# Patient Record
Sex: FEMALE | Race: BLACK OR AFRICAN AMERICAN | NOT HISPANIC OR LATINO | URBAN - METROPOLITAN AREA
[De-identification: names, ages, dates, MRNs, and addresses within clinical notes are randomized per-mention and may not be internally consistent; named-entity substitution may affect disease eponyms.]

---

## 2018-02-22 ENCOUNTER — OUTPATIENT (OUTPATIENT)
Dept: OUTPATIENT SERVICES | Facility: HOSPITAL | Age: 38
LOS: 1 days | Discharge: HOME | End: 2018-02-22

## 2018-02-22 ENCOUNTER — APPOINTMENT (OUTPATIENT)
Dept: ANTEPARTUM | Facility: CLINIC | Age: 38
End: 2018-02-22

## 2018-03-29 ENCOUNTER — OUTPATIENT (OUTPATIENT)
Dept: OUTPATIENT SERVICES | Facility: HOSPITAL | Age: 38
LOS: 1 days | Discharge: HOME | End: 2018-03-29

## 2018-03-29 ENCOUNTER — APPOINTMENT (OUTPATIENT)
Dept: ANTEPARTUM | Facility: CLINIC | Age: 38
End: 2018-03-29

## 2018-03-29 ENCOUNTER — NON-APPOINTMENT (OUTPATIENT)
Age: 38
End: 2018-03-29

## 2018-03-29 VITALS
TEMPERATURE: 97.7 F | BODY MASS INDEX: 35.38 KG/M2 | HEIGHT: 67 IN | DIASTOLIC BLOOD PRESSURE: 70 MMHG | SYSTOLIC BLOOD PRESSURE: 127 MMHG | OXYGEN SATURATION: 99 % | WEIGHT: 225.4 LBS | HEART RATE: 110 BPM

## 2018-03-29 DIAGNOSIS — O24.919 UNSPECIFIED DIABETES MELLITUS IN PREGNANCY, UNSPECIFIED TRIMESTER: ICD-10-CM

## 2018-03-29 DIAGNOSIS — O99.330 SMOKING (TOBACCO) COMPLICATING PREGNANCY, UNSPECIFIED TRIMESTER: ICD-10-CM

## 2018-03-29 LAB
FETAL MOVEMENT: PRESENT
OB COMMENTS: NORMAL
SCHEDULED VISIT: YES
URINE ALBUMIN/PROTEIN: NORMAL

## 2018-03-29 RX ORDER — LANCETS 33 GAUGE
EACH MISCELLANEOUS
Qty: 120 | Refills: 4 | Status: ACTIVE | COMMUNITY
Start: 2018-03-29 | End: 1900-01-01

## 2018-03-29 RX ORDER — CHLORHEXIDINE GLUCONATE 4 %
325 (65 FE) LIQUID (ML) TOPICAL
Qty: 100 | Refills: 0 | Status: ACTIVE | COMMUNITY
Start: 2018-02-09

## 2018-03-29 RX ORDER — BLOOD-GLUCOSE METER
W/DEVICE EACH MISCELLANEOUS
Qty: 1 | Refills: 0 | Status: ACTIVE | COMMUNITY
Start: 2018-03-29 | End: 1900-01-01

## 2018-03-29 RX ORDER — BLOOD SUGAR DIAGNOSTIC
STRIP MISCELLANEOUS
Qty: 150 | Refills: 4 | Status: ACTIVE | COMMUNITY
Start: 2018-03-29 | End: 1900-01-01

## 2018-03-29 RX ORDER — PRENATAL VIT NO.126/IRON/FOLIC 28MG-0.8MG
28-0.8 TABLET ORAL
Qty: 30 | Refills: 0 | Status: ACTIVE | COMMUNITY
Start: 2018-02-09

## 2018-03-30 LAB — GLUCOSE BLDC GLUCOMTR-MCNC: 130

## 2018-04-05 ENCOUNTER — APPOINTMENT (OUTPATIENT)
Dept: ANTEPARTUM | Facility: CLINIC | Age: 38
End: 2018-04-05

## 2018-04-05 ENCOUNTER — APPOINTMENT (OUTPATIENT)
Dept: OBGYN | Facility: CLINIC | Age: 38
End: 2018-04-05

## 2018-04-05 ENCOUNTER — NON-APPOINTMENT (OUTPATIENT)
Age: 38
End: 2018-04-05

## 2018-04-05 ENCOUNTER — OTHER (OUTPATIENT)
Age: 38
End: 2018-04-05

## 2018-04-05 ENCOUNTER — OUTPATIENT (OUTPATIENT)
Dept: OUTPATIENT SERVICES | Facility: HOSPITAL | Age: 38
LOS: 1 days | Discharge: HOME | End: 2018-04-05

## 2018-04-05 ENCOUNTER — RESULT REVIEW (OUTPATIENT)
Age: 38
End: 2018-04-05

## 2018-04-05 VITALS
OXYGEN SATURATION: 100 % | SYSTOLIC BLOOD PRESSURE: 129 MMHG | TEMPERATURE: 96.3 F | WEIGHT: 224 LBS | DIASTOLIC BLOOD PRESSURE: 65 MMHG | BODY MASS INDEX: 35.16 KG/M2 | HEART RATE: 94 BPM | HEIGHT: 67 IN

## 2018-04-05 LAB
FETAL HEART DESCRIPTION: NORMAL
FETAL HEART RATE (BPM): 126
FETAL MOVEMENT: PRESENT
FETAL PRESENTATION: NORMAL
GLUCOSE BLDC GLUCOMTR-MCNC: 109
OB COMMENTS: NORMAL
SCHEDULED VISIT: YES
WEEKS GESTATION: NORMAL

## 2018-04-06 DIAGNOSIS — Z3A.30 30 WEEKS GESTATION OF PREGNANCY: ICD-10-CM

## 2018-04-06 DIAGNOSIS — O24.419 GESTATIONAL DIABETES MELLITUS IN PREGNANCY, UNSPECIFIED CONTROL: ICD-10-CM

## 2018-04-12 ENCOUNTER — NON-APPOINTMENT (OUTPATIENT)
Age: 38
End: 2018-04-12

## 2018-04-12 ENCOUNTER — APPOINTMENT (OUTPATIENT)
Dept: OBGYN | Facility: CLINIC | Age: 38
End: 2018-04-12

## 2018-04-12 ENCOUNTER — APPOINTMENT (OUTPATIENT)
Dept: ANTEPARTUM | Facility: CLINIC | Age: 38
End: 2018-04-12

## 2018-04-12 ENCOUNTER — OUTPATIENT (OUTPATIENT)
Dept: OUTPATIENT SERVICES | Facility: HOSPITAL | Age: 38
LOS: 1 days | Discharge: HOME | End: 2018-04-12

## 2018-04-12 ENCOUNTER — OTHER (OUTPATIENT)
Age: 38
End: 2018-04-12

## 2018-04-12 VITALS
SYSTOLIC BLOOD PRESSURE: 134 MMHG | DIASTOLIC BLOOD PRESSURE: 77 MMHG | TEMPERATURE: 97.1 F | WEIGHT: 226.19 LBS | OXYGEN SATURATION: 100 % | BODY MASS INDEX: 35.5 KG/M2 | HEART RATE: 100 BPM | HEIGHT: 67 IN

## 2018-04-12 LAB
FETAL HEART DESCRIPTION: NORMAL
FETAL HEART RATE (BPM): 137
FETAL MOVEMENT: PRESENT
FETAL PRESENTATION: NORMAL
GLUCOSE BLDC GLUCOMTR-MCNC: 180
OB COMMENTS: NORMAL
SCHEDULED VISIT: YES
URINE ALBUMIN/PROTEIN: 2
URINE GLUCOSE: 1000
URINE KETONES: NEGATIVE
WEEKS GESTATION: NORMAL

## 2018-04-12 RX ORDER — GLYBURIDE 2.5 MG/1
2.5 TABLET ORAL DAILY
Qty: 30 | Refills: 5 | Status: ACTIVE | COMMUNITY
Start: 2018-04-12 | End: 1900-01-01

## 2018-04-19 ENCOUNTER — APPOINTMENT (OUTPATIENT)
Dept: ANTEPARTUM | Facility: CLINIC | Age: 38
End: 2018-04-19

## 2018-04-19 ENCOUNTER — NON-APPOINTMENT (OUTPATIENT)
Age: 38
End: 2018-04-19

## 2018-04-19 ENCOUNTER — OUTPATIENT (OUTPATIENT)
Dept: OUTPATIENT SERVICES | Facility: HOSPITAL | Age: 38
LOS: 1 days | Discharge: HOME | End: 2018-04-19

## 2018-04-19 VITALS — SYSTOLIC BLOOD PRESSURE: 121 MMHG | DIASTOLIC BLOOD PRESSURE: 60 MMHG | HEART RATE: 86 BPM

## 2018-04-19 VITALS
HEART RATE: 96 BPM | WEIGHT: 223 LBS | BODY MASS INDEX: 34.93 KG/M2 | OXYGEN SATURATION: 97 % | SYSTOLIC BLOOD PRESSURE: 159 MMHG | DIASTOLIC BLOOD PRESSURE: 81 MMHG | TEMPERATURE: 98 F

## 2018-04-19 LAB
GLUCOSE BLDC GLUCOMTR-MCNC: 148
OB COMMENTS: NORMAL
SCHEDULED VISIT: YES
URINE ALBUMIN/PROTEIN: NORMAL
WEEKS GESTATION: NORMAL

## 2018-04-26 ENCOUNTER — APPOINTMENT (OUTPATIENT)
Dept: ANTEPARTUM | Facility: CLINIC | Age: 38
End: 2018-04-26

## 2018-04-26 ENCOUNTER — OUTPATIENT (OUTPATIENT)
Dept: OUTPATIENT SERVICES | Facility: HOSPITAL | Age: 38
LOS: 1 days | Discharge: HOME | End: 2018-04-26

## 2018-04-26 ENCOUNTER — NON-APPOINTMENT (OUTPATIENT)
Age: 38
End: 2018-04-26

## 2018-04-26 VITALS — SYSTOLIC BLOOD PRESSURE: 133 MMHG | DIASTOLIC BLOOD PRESSURE: 73 MMHG

## 2018-04-26 VITALS
BODY MASS INDEX: 34.64 KG/M2 | WEIGHT: 221.19 LBS | HEART RATE: 92 BPM | SYSTOLIC BLOOD PRESSURE: 141 MMHG | OXYGEN SATURATION: 100 % | DIASTOLIC BLOOD PRESSURE: 73 MMHG | TEMPERATURE: 98.6 F

## 2018-04-26 LAB
BILIRUB UR QL STRIP: NEGATIVE
CLARITY UR: CLEAR
COLLECTION METHOD: NORMAL
FETAL HEART DESCRIPTION: NORMAL
FETAL HEART RATE (BPM): 144
FETAL MOVEMENT: PRESENT
FETAL PRESENTATION: NORMAL
GLUCOSE BLDC GLUCOMTR-MCNC: 82
GLUCOSE UR-MCNC: NEGATIVE
HCG UR QL: 0.2 EU/DL
HGB UR QL STRIP.AUTO: NEGATIVE
KETONES UR-MCNC: NORMAL
LEUKOCYTE ESTERASE UR QL STRIP: NEGATIVE
NITRITE UR QL STRIP: NEGATIVE
OB COMMENTS: NORMAL
PH UR STRIP: 6
PROT UR STRIP-MCNC: NEGATIVE
SCHEDULED VISIT: NORMAL
SP GR UR STRIP: 1.02
URINE ALBUMIN/PROTEIN: NEGATIVE
URINE GLUCOSE: NEGATIVE
URINE KETONES: NORMAL
WEEKS GESTATION: NORMAL

## 2018-04-30 DIAGNOSIS — O24.419 GESTATIONAL DIABETES MELLITUS IN PREGNANCY, UNSPECIFIED CONTROL: ICD-10-CM

## 2018-05-03 ENCOUNTER — APPOINTMENT (OUTPATIENT)
Dept: ANTEPARTUM | Facility: CLINIC | Age: 38
End: 2018-05-03

## 2018-05-10 ENCOUNTER — OUTPATIENT (OUTPATIENT)
Dept: OUTPATIENT SERVICES | Facility: HOSPITAL | Age: 38
LOS: 1 days | Discharge: HOME | End: 2018-05-10

## 2018-05-10 ENCOUNTER — APPOINTMENT (OUTPATIENT)
Dept: ANTEPARTUM | Facility: CLINIC | Age: 38
End: 2018-05-10

## 2018-05-10 VITALS
BODY MASS INDEX: 35.02 KG/M2 | HEART RATE: 78 BPM | SYSTOLIC BLOOD PRESSURE: 129 MMHG | WEIGHT: 223.56 LBS | OXYGEN SATURATION: 99 % | TEMPERATURE: 97.3 F | DIASTOLIC BLOOD PRESSURE: 76 MMHG

## 2018-05-10 LAB
BILIRUB UR QL STRIP: NEGATIVE
CLARITY UR: CLEAR
COLLECTION METHOD: NORMAL
GLUCOSE BLDC GLUCOMTR-MCNC: 90
GLUCOSE UR-MCNC: NEGATIVE
HCG UR QL: 0.2 EU/DL
HGB UR QL STRIP.AUTO: NEGATIVE
KETONES UR-MCNC: NEGATIVE
LEUKOCYTE ESTERASE UR QL STRIP: NEGATIVE
NITRITE UR QL STRIP: NEGATIVE
OB COMMENTS: NORMAL
PH UR STRIP: 6.5
PROT UR STRIP-MCNC: NEGATIVE
SCHEDULED VISIT: YES
SP GR UR STRIP: 1.01
URINE ALBUMIN/PROTEIN: NORMAL
WEEKS GESTATION: NORMAL

## 2018-05-11 DIAGNOSIS — O24.919 UNSPECIFIED DIABETES MELLITUS IN PREGNANCY, UNSPECIFIED TRIMESTER: ICD-10-CM

## 2018-05-14 LAB
GP B STREP DNA SPEC QL NAA+PROBE: NORMAL
GP B STREP DNA SPEC QL NAA+PROBE: NOT DETECTED
SOURCE GBS: NORMAL

## 2018-05-17 ENCOUNTER — APPOINTMENT (OUTPATIENT)
Dept: ANTEPARTUM | Facility: CLINIC | Age: 38
End: 2018-05-17

## 2018-05-17 ENCOUNTER — OUTPATIENT (OUTPATIENT)
Dept: OUTPATIENT SERVICES | Facility: HOSPITAL | Age: 38
LOS: 1 days | Discharge: HOME | End: 2018-05-17

## 2018-05-17 ENCOUNTER — TRANSCRIPTION ENCOUNTER (OUTPATIENT)
Age: 38
End: 2018-05-17

## 2018-05-17 ENCOUNTER — MEDICATION RENEWAL (OUTPATIENT)
Age: 38
End: 2018-05-17

## 2018-05-17 VITALS — HEIGHT: 67 IN | HEART RATE: 95 BPM | TEMPERATURE: 97.4 F | OXYGEN SATURATION: 99 %

## 2018-05-17 VITALS
DIASTOLIC BLOOD PRESSURE: 76 MMHG | RESPIRATION RATE: 18 BRPM | HEART RATE: 81 BPM | TEMPERATURE: 98 F | SYSTOLIC BLOOD PRESSURE: 136 MMHG

## 2018-05-17 LAB
A VAGINAE DNA VAG QL NAA+PROBE: NORMAL
BVAB2 DNA VAG QL NAA+PROBE: NORMAL
C KRUSEI DNA VAG QL NAA+PROBE: NEGATIVE
C TRACH RRNA SPEC QL NAA+PROBE: NEGATIVE
FETAL HEART DESCRIPTION: NORMAL
FETAL HEART RATE (BPM): 141
FETAL MOVEMENT: PRESENT
FETAL PRESENTATION: NORMAL
GLUCOSE BLDC GLUCOMTR-MCNC: 95
MEGA1 DNA VAG QL NAA+PROBE: NORMAL
N GONORRHOEA RRNA SPEC QL NAA+PROBE: NEGATIVE
OB COMMENTS: NORMAL
SCHEDULED VISIT: YES
T VAGINALIS RRNA SPEC QL NAA+PROBE: NEGATIVE
WEEKS GESTATION: NORMAL

## 2018-05-17 RX ORDER — GLYBURIDE 2.5 MG/1
2.5 TABLET ORAL DAILY
Qty: 7 | Refills: 0 | Status: ACTIVE | COMMUNITY
Start: 2018-05-17 | End: 1900-01-01

## 2018-05-17 NOTE — OB PROVIDER TRIAGE NOTE - NSHPPHYSICALEXAM_GEN_ALL_CORE
PHYSICAL EXAM:  T(F): 97.9 (05-17 @ 13:06)  HR: 81 (05-17 @ 13:06)  BP: 136/76 (05-17 @ 13:06)  RR: 18 (05-17 @ 13:06)  Constitutional: AAOx3, NAD  Abdomen: Soft, gravid, nontender, lightly palpable ctx

## 2018-05-17 NOTE — OB PROVIDER TRIAGE NOTE - HISTORY OF PRESENT ILLNESS
37  at 36w3d by LMP, here sent from [facility] for contractions, comes and goes, q20 mins, since this morning. 4-5/10 pain. Denies LOF or VB. Good fetal movement. GDMA2 during the pregnancy, fasting 80-95, 2hpp , on glyburide 7.5 daily. No other issues with the pregnancy.

## 2018-05-17 NOTE — OB PROVIDER TRIAGE NOTE - NSOBPROVIDERNOTE_OBGYN_ALL_OB_FT
37  at 36w3d, GBS neg, with ctx pain, desires to go home and drink water and relax, will return to L&D if the ctx get worse. precautions given, pt to f/u with next prenatal appt next week. glyburide refill given to the pt. 37  at 36w3d, GBS neg, with ctx pain, desires to go home and drink water and relax, will return to L&D if the ctx get worse. precautions given, pt to f/u with next prenatal appt next week. glyburide refill given to the pt sent via [employer] 37  at 36w3d, GBS neg, with ctx pain, desires to go home and drink water and relax, will return to L&D if the ctx get worse. precautions given, pt to f/u with next prenatal appt next week. glyburide refill given to the pt sent via [employer]    Attending    Pt seen and examined and agree with resident note and plan of care  nst reactive

## 2018-05-17 NOTE — DISCHARGE NOTE ANTEPARTUM - CARE PLAN
Principal Discharge DX:	Pregnant  Goal:	healthy patient  Assessment and plan of treatment:	healthy patient, followup with your doctor

## 2018-05-17 NOTE — DISCHARGE NOTE ANTEPARTUM - CARE PROVIDER_API CALL
Renan Trevino), Medical Genetics; Obstetrics and Gynecology  [address] [place] [address]  Phone: [phone]  Fax: [phone]

## 2018-05-17 NOTE — DISCHARGE NOTE ANTEPARTUM - CARE PROVIDERS DIRECT ADDRESSES
[email or web address]

## 2018-05-17 NOTE — DISCHARGE NOTE ANTEPARTUM - PATIENT PORTAL LINK FT
You can access the [facility] Patient Portal, offered by [facility], by registering with the following website: [email or web address]

## 2018-05-19 ENCOUNTER — OUTPATIENT (OUTPATIENT)
Dept: OUTPATIENT SERVICES | Facility: HOSPITAL | Age: 38
LOS: 1 days | Discharge: HOME | End: 2018-05-19

## 2018-05-19 ENCOUNTER — TRANSCRIPTION ENCOUNTER (OUTPATIENT)
Age: 38
End: 2018-05-19

## 2018-05-19 VITALS
TEMPERATURE: 98 F | DIASTOLIC BLOOD PRESSURE: 68 MMHG | RESPIRATION RATE: 20 BRPM | HEART RATE: 85 BPM | SYSTOLIC BLOOD PRESSURE: 139 MMHG

## 2018-05-19 VITALS — TEMPERATURE: 98 F

## 2018-05-19 DIAGNOSIS — Z98.890 OTHER SPECIFIED POSTPROCEDURAL STATES: Chronic | ICD-10-CM

## 2018-05-19 LAB
AMPHET UR-MCNC: NEGATIVE — SIGNIFICANT CHANGE UP
APPEARANCE UR: CLEAR — SIGNIFICANT CHANGE UP
BARBITURATES UR SCN-MCNC: NEGATIVE — SIGNIFICANT CHANGE UP
BENZODIAZ UR-MCNC: NEGATIVE — SIGNIFICANT CHANGE UP
BILIRUB UR-MCNC: NEGATIVE — SIGNIFICANT CHANGE UP
COCAINE METAB.OTHER UR-MCNC: NEGATIVE — SIGNIFICANT CHANGE UP
COLOR SPEC: YELLOW — SIGNIFICANT CHANGE UP
DIFF PNL FLD: NEGATIVE — SIGNIFICANT CHANGE UP
ESTIMATED AVERAGE GLUCOSE: 131 MG/DL — HIGH (ref 68–114)
GLUCOSE UR QL: NEGATIVE MG/DL — SIGNIFICANT CHANGE UP
HBA1C BLD-MCNC: 6.2 % — HIGH (ref 4–5.6)
HCT VFR BLD CALC: 36.8 % — LOW (ref 37–47)
HGB BLD-MCNC: 11.8 G/DL — LOW (ref 12–16)
HIV 1+2 AB+HIV1 P24 AG SERPL QL IA: SIGNIFICANT CHANGE UP
KETONES UR-MCNC: NEGATIVE — SIGNIFICANT CHANGE UP
LEUKOCYTE ESTERASE UR-ACNC: NEGATIVE — SIGNIFICANT CHANGE UP
MCHC RBC-ENTMCNC: 23.6 PG — LOW (ref 27–31)
MCHC RBC-ENTMCNC: 32.1 G/DL — SIGNIFICANT CHANGE UP (ref 32–37)
MCV RBC AUTO: 73.5 FL — LOW (ref 81–99)
METHADONE UR-MCNC: NEGATIVE — SIGNIFICANT CHANGE UP
NITRITE UR-MCNC: NEGATIVE — SIGNIFICANT CHANGE UP
NRBC # BLD: 0 /100 WBCS — SIGNIFICANT CHANGE UP (ref 0–0)
OPIATES UR-MCNC: NEGATIVE — SIGNIFICANT CHANGE UP
PCP SPEC-MCNC: SIGNIFICANT CHANGE UP
PH UR: 6.5 — SIGNIFICANT CHANGE UP (ref 5–8)
PLATELET # BLD AUTO: 271 K/UL — SIGNIFICANT CHANGE UP (ref 130–400)
PROPOXYPHENE QUALITATIVE URINE RESULT: NEGATIVE — SIGNIFICANT CHANGE UP
PROT UR-MCNC: NEGATIVE MG/DL — SIGNIFICANT CHANGE UP
RBC # BLD: 5.01 M/UL — SIGNIFICANT CHANGE UP (ref 4.2–5.4)
RBC # FLD: 14.7 % — HIGH (ref 11.5–14.5)
SP GR SPEC: <=1.005 — SIGNIFICANT CHANGE UP (ref 1.01–1.03)
UROBILINOGEN FLD QL: 0.2 MG/DL — SIGNIFICANT CHANGE UP (ref 0.2–0.2)
WBC # BLD: 9.71 K/UL — SIGNIFICANT CHANGE UP (ref 4.8–10.8)
WBC # FLD AUTO: 9.71 K/UL — SIGNIFICANT CHANGE UP (ref 4.8–10.8)

## 2018-05-19 RX ORDER — SODIUM CHLORIDE 9 MG/ML
1000 INJECTION, SOLUTION INTRAVENOUS
Qty: 0 | Refills: 0 | Status: DISCONTINUED | OUTPATIENT
Start: 2018-05-19 | End: 2018-06-03

## 2018-05-19 RX ORDER — ALBUTEROL 90 UG/1
2.5 AEROSOL, METERED ORAL ONCE
Qty: 0 | Refills: 0 | Status: COMPLETED | OUTPATIENT
Start: 2018-05-19 | End: 2018-05-19

## 2018-05-19 RX ADMIN — ALBUTEROL 2.5 MILLIGRAM(S): 90 AEROSOL, METERED ORAL at 06:24

## 2018-05-19 NOTE — OB PROVIDER TRIAGE NOTE - NSHPLABSRESULTS_GEN_ALL_CORE
Sonogram 5/17 @36w3d  BPP 8/8 mvp 43mm  5/10 @35w3d EFW 2627gm (44%) mvp 45mm   GBS negative  GC/CT negative

## 2018-05-19 NOTE — OB PROVIDER TRIAGE NOTE - HISTORY OF PRESENT ILLNESS
37y  at 26w5d GA dated by 24weeks fabián presents to L&D due to contractions every 15min 8/10. Denies VB or LOF. Good FM. She was seen in L&D 2 days ago with contractions and was closed on vaginal exam. Reports contractions got worse since then. Denies fever/chills, nausea/vomiting, diarrhea, urinary symptoms. Patient has GDMA2 on glyburide 2.5mg QD, FS well controlled (FS 80-90; PP 100s). Smoker (2igs/day). No other complications during this pregnancy. GBS negative.     OBHx:   FT x2 largest [id number] ( and ) no complications, sabx1 with d&c and etopx2 with d&cx2  GYNHx: h/o chlamydia. Denies h/o ovarian cysts, abnormal paps, fibroids, other STIs 37y  at 36w5d GA dated by 24weeks fabián presents to L&D due to contractions every 15min 8/10. Denies VB or LOF. Good FM. She was seen in L&D 2 days ago with contractions and was closed on vaginal exam. Reports contractions got worse since then. Denies fever/chills, nausea/vomiting, diarrhea, urinary symptoms. Patient has GDMA2 on glyburide 2.5mg QD, FS well controlled (FS 80-90; PP 100s). Smoker (2igs/day). No other complications during this pregnancy. GBS negative.     OBHx:   FT x2 largest [id number] ( and ) no complications, sabx1 with d&c and etopx2 with d&cx2  GYNHx: h/o chlamydia. Denies h/o ovarian cysts, abnormal paps, fibroids, other STIs

## 2018-05-19 NOTE — PROGRESS NOTE ADULT - SUBJECTIVE AND OBJECTIVE BOX
PGY 2 note:    Patient seen at bedside s/p IV hydration and nebulizer treatment. Feeling better at this time, no more contractions and wheezing and SOB resolved. Pre peak flow 150, and post peak flow 200, saturating at 100% on RA.     T(F): 98.24 (19 May 2018 07:05), Max: 98.7 (19 May 2018 01:22)  FS: 96 (random) / 121  HR: 85 (19 May 2018 07:05) (65 - 87)  BP: 139/68 (19 May 2018 07:05) (114/72 - 139/68)  RR: 20 (19 May 2018 07:05) (20 - 20)  SpO2: 100% (19 May 2018 07:02) (99% - 100%)    Gen: NAD, A&Ox3  Heart: S1S2, RRR  Lungs: CTAB  Abd: Gravid, soft, NT, nonpalpable ctx  SVE: C/L/P  EFM: 145/mod/+accels  [place]: None    No labs done    Meds:  0624 Albuterol nebulizer

## 2018-05-19 NOTE — DISCHARGE NOTE ANTEPARTUM - PLAN OF CARE
Healthy pregnancy Discharge home  Labor Precautions given  Oral hydration encouraged  Tylenol PRN for pain  FKC instructions  F/u with OB at next scheduled appt

## 2018-05-19 NOTE — DISCHARGE NOTE ANTEPARTUM - CARE PROVIDER_API CALL
Renan Trevino), Medical Genetics; Obstetrics and Gynecology  [address] [place] [address]  Phone: [phone]  Fax: [phone]

## 2018-05-19 NOTE — OB PROVIDER TRIAGE NOTE - NSHPPHYSICALEXAM_GEN_ALL_CORE
Vital Signs   T(C): 36.7   T(F): 98   HR: 87   BP: 128/71  Udip: negative   GEN: NAD alert and oriented  ABD: soft gravid, not tender, no palpable contractions  EFM:  [place]:  VE: Vital Signs   T(C): 36.7   T(F): 98   HR: 87   BP: 128/71  FS:  Udip: negative   GEN: NAD alert and oriented  ABD: soft gravid, not tender, no palpable contractions  EFM: 130/mod/+accels  [place]: irregular  VE: C/L/P

## 2018-05-19 NOTE — PROGRESS NOTE ADULT - ASSESSMENT
A/P: 37y  at 36w5d GA, GDMA2 on glyburide 2.5mg QD well controlled, asthma, s/p IV hydration, s/p Albuterol neb tx, not in  labor, maternal and fetal well being reassured  - DC home  - DC instructions given  - PTL precautions given  - FKC instructions given  - F/u with HR OB this week at next scheduled appt    Dr. Betancourt and Dr. Bowman aware

## 2018-05-19 NOTE — OB PROVIDER TRIAGE NOTE - NSOBPROVIDERNOTE_OBGYN_ALL_OB_FT
37y  at 26w5d GA, GDMA2 on glyburide 2.5mg QD well controlled, not in labor  - 37y  at 36w5d GA, GDMA2 on glyburide 2.5mg QD well controlled, for therapeutic rest  - IVFs  - Stadol PRN  - Cotinuous monitoring  - CBC, Hba1c and HIV 3rd trimester (patient did not go for this labs yet)  - Reevaluate     Dr Poon and Dr Bowman aware.

## 2018-05-19 NOTE — DISCHARGE NOTE ANTEPARTUM - PATIENT PORTAL LINK FT
You can access the [facility] Patient Portal, offered by [facility], by registering with the following website: [email or web address] Psychiatric [facility]

## 2018-05-19 NOTE — DISCHARGE NOTE ANTEPARTUM - HOSPITAL COURSE
Patient observed in L&D to rule out PTL and for IV hydration. Also complained of wheezing and shortness of breath. PTL ruled out and patient felt much better after treatment. DC home.

## 2018-05-19 NOTE — PROGRESS NOTE ADULT - SUBJECTIVE AND OBJECTIVE BOX
PGY1 note    Patient seen aat bedside for complaints of chest tightness and shortness of breath. Reports she gets similar symptoms with her asthma attacks. Takes albuterol inhaler or nebulizer at home PRN.    T(F): 98.7 (01:22)  HR: 67 (06:52)  BP: 128/62 (06:39)  RR: 20 (01:22)    EFM:   TOCO:  SVE:    Medications:  ALBUTerol    0.083%: 2.5 milliGRAM(s) (05-19-18 @ 06:24)      Labs:                        11.8   9.71  )-----------( 271      ( 19 May 2018 03:00 )             36.8             Urinalysis Basic - ( 19 May 2018 03:51 )    Color: Yellow / Appearance: Clear / SG: <=1.005 / pH: x  Gluc: x / Ketone: Negative  / Bili: Negative / Urobili: 0.2 mg/dL   Blood: x / Protein: Negative mg/dL / Nitrite: Negative   Leuk Esterase: Negative / RBC: x / WBC x   Sq Epi: x / Non Sq Epi: x / Bacteria: x          A/P:   37y at Gestational Age     - c/w current management  - continuous toco/EFM  - pain management PRN    D/w  PGY1 note    Patient seen aat bedside for complaints of chest tightness and shortness of breath. Reports she gets similar symptoms with her asthma attacks. Takes albuterol inhaler or nebulizer at home PRN.    T(F): 98.7 (01:22)  HR: 67 (06:52)  BP: 128/62 (06:39)  RR: 20 (01:22)  sat: %    GEN: appears in respiratory distress  lungs: CTAB, wheezes bilaterally  CVS: RRR, normal S1/S2  EFM: 140/mod/accels+   TOCO: occasional ctx  SVE: deferred, last exam at 0206 was 0/0/-3      Labs:                        11.8   9.71  )-----------( 271      ( 19 May 2018 03:00 )             36.8     Urinalysis Basic - ( 19 May 2018 03:51 )    Color: Yellow / Appearance: Clear / SG: <=1.005 / pH: x  Gluc: x / Ketone: Negative  / Bili: Negative / Urobili: 0.2 mg/dL   Blood: x / Protein: Negative mg/dL / Nitrite: Negative   Leuk Esterase: Negative / RBC: x / WBC x   Sq Epi: x / Non Sq Epi: x / Bacteria: x    A/P:  38 y/o  at 36w5d GA, GDMA2 on glyburide 2.5mg QD well controlled, h/o asthma, on IVF for painful contractions, with clinical asthma attack, s/p albuterol inhaler. PEF pre albuterol 150.  - monitor symptoms  - continuous EFM and toco  - stadol as needed  - FS fasting and 2h PP    Dr. Poon aware. Dr. Bowman to be made aware.

## 2018-05-19 NOTE — DISCHARGE NOTE ANTEPARTUM - CARE PLAN
Principal Discharge DX:	Pregnant and not yet delivered  Goal:	Healthy pregnancy  Assessment and plan of treatment:	Discharge home  Labor Precautions given  Oral hydration encouraged  Tylenol PRN for pain  FKC instructions  F/u with OB at next scheduled appt

## 2018-05-24 ENCOUNTER — APPOINTMENT (OUTPATIENT)
Dept: ANTEPARTUM | Facility: CLINIC | Age: 38
End: 2018-05-24

## 2018-05-28 ENCOUNTER — INPATIENT (INPATIENT)
Facility: HOSPITAL | Age: 38
LOS: 2 days | Discharge: HOME HEALTH | End: 2018-05-31
Attending: OBSTETRICS & GYNECOLOGY | Admitting: OBSTETRICS & GYNECOLOGY

## 2018-05-28 VITALS — TEMPERATURE: 99 F

## 2018-05-28 DIAGNOSIS — Z98.890 OTHER SPECIFIED POSTPROCEDURAL STATES: Chronic | ICD-10-CM

## 2018-05-28 LAB
BASOPHILS # BLD AUTO: 0.04 K/UL — SIGNIFICANT CHANGE UP (ref 0–0.2)
BASOPHILS NFR BLD AUTO: 0.4 % — SIGNIFICANT CHANGE UP (ref 0–1)
EOSINOPHIL # BLD AUTO: 0.17 K/UL — SIGNIFICANT CHANGE UP (ref 0–0.7)
EOSINOPHIL NFR BLD AUTO: 1.7 % — SIGNIFICANT CHANGE UP (ref 0–8)
HCT VFR BLD CALC: 37 % — SIGNIFICANT CHANGE UP (ref 37–47)
HGB BLD-MCNC: 11.8 G/DL — LOW (ref 12–16)
IMM GRANULOCYTES NFR BLD AUTO: 0.7 % — HIGH (ref 0.1–0.3)
LYMPHOCYTES # BLD AUTO: 2.51 K/UL — SIGNIFICANT CHANGE UP (ref 1.2–3.4)
LYMPHOCYTES # BLD AUTO: 25.1 % — SIGNIFICANT CHANGE UP (ref 20.5–51.1)
MCHC RBC-ENTMCNC: 23.4 PG — LOW (ref 27–31)
MCHC RBC-ENTMCNC: 31.9 G/DL — LOW (ref 32–37)
MCV RBC AUTO: 73.3 FL — LOW (ref 81–99)
MONOCYTES # BLD AUTO: 1.16 K/UL — HIGH (ref 0.1–0.6)
MONOCYTES NFR BLD AUTO: 11.6 % — HIGH (ref 1.7–9.3)
NEUTROPHILS # BLD AUTO: 6.06 K/UL — SIGNIFICANT CHANGE UP (ref 1.4–6.5)
NEUTROPHILS NFR BLD AUTO: 60.5 % — SIGNIFICANT CHANGE UP (ref 42.2–75.2)
NRBC # BLD: 0 /100 WBCS — SIGNIFICANT CHANGE UP (ref 0–0)
PLATELET # BLD AUTO: 247 K/UL — SIGNIFICANT CHANGE UP (ref 130–400)
RBC # BLD: 5.05 M/UL — SIGNIFICANT CHANGE UP (ref 4.2–5.4)
RBC # FLD: 14.7 % — HIGH (ref 11.5–14.5)
WBC # BLD: 10.01 K/UL — SIGNIFICANT CHANGE UP (ref 4.8–10.8)
WBC # FLD AUTO: 10.01 K/UL — SIGNIFICANT CHANGE UP (ref 4.8–10.8)

## 2018-05-28 RX ORDER — LABETALOL HCL 100 MG
40 TABLET ORAL ONCE
Qty: 0 | Refills: 0 | Status: COMPLETED | OUTPATIENT
Start: 2018-05-28 | End: 2018-05-28

## 2018-05-28 RX ORDER — CITRIC ACID/SODIUM CITRATE 300-500 MG
15 SOLUTION, ORAL ORAL EVERY 4 HOURS
Qty: 0 | Refills: 0 | Status: DISCONTINUED | OUTPATIENT
Start: 2018-05-28 | End: 2018-05-30

## 2018-05-28 RX ORDER — ALBUTEROL 90 UG/1
3 AEROSOL, METERED ORAL
Qty: 0 | Refills: 0 | COMMUNITY

## 2018-05-28 RX ORDER — SODIUM CHLORIDE 9 MG/ML
125 INJECTION, SOLUTION INTRAVENOUS ONCE
Qty: 0 | Refills: 0 | Status: DISCONTINUED | OUTPATIENT
Start: 2018-05-28 | End: 2018-05-30

## 2018-05-28 RX ORDER — BUTORPHANOL TARTRATE 2 MG/ML
2 INJECTION, SOLUTION INTRAMUSCULAR; INTRAVENOUS ONCE
Qty: 0 | Refills: 0 | Status: DISCONTINUED | OUTPATIENT
Start: 2018-05-28 | End: 2018-05-28

## 2018-05-28 RX ORDER — LABETALOL HCL 100 MG
20 TABLET ORAL ONCE
Qty: 0 | Refills: 0 | Status: COMPLETED | OUTPATIENT
Start: 2018-05-28 | End: 2018-05-28

## 2018-05-28 RX ORDER — OXYTOCIN 10 UNIT/ML
125 VIAL (ML) INJECTION
Qty: 20 | Refills: 0 | Status: DISCONTINUED | OUTPATIENT
Start: 2018-05-28 | End: 2018-05-30

## 2018-05-28 RX ORDER — SODIUM CHLORIDE 9 MG/ML
1000 INJECTION, SOLUTION INTRAVENOUS
Qty: 0 | Refills: 0 | Status: DISCONTINUED | OUTPATIENT
Start: 2018-05-28 | End: 2018-05-30

## 2018-05-28 RX ADMIN — Medication 40 MILLIGRAM(S): at 23:10

## 2018-05-28 RX ADMIN — Medication 20 MILLIGRAM(S): at 22:55

## 2018-05-28 NOTE — OB PROVIDER H&P - NSHPPHYSICALEXAM_GEN_ALL_CORE
Vital Signs Last 24 Hrs  T(C): 37.1 (28 May 2018 22:07), Max: 37.1 (28 May 2018 21:57)  T(F): 98.8 (28 May 2018 22:07), Max: 98.8 (28 May 2018 22:07)  HR: 97 (28 May 2018 22:15) (87 - 97)  BP: 170/83 (28 May 2018 22:15) (144/88 - 170/83)  RR: 20 (28 May 2018 22:07) (20 - 20)    EFM: 130/mod/accels+  [place]: 2-7mins (irreg)   SVE: 4/90/-2, vtx, intact

## 2018-05-28 NOTE — OB PROVIDER H&P - HISTORY OF PRESENT ILLNESS
37y  at 38w0d dated by 24week SEBASTIÁN lockwood, who presents to L&D for ctx since afternoon, Q5-7mins, 8/10 intensity. Denies LOF, vaginal bleeding. Reports good fetal movement. Patient has GDMA2 on glyburide 2.5mg QD, FS well controlled (fasting 90s, 2hr PP 90s). Smoker (2igs/day). HbA1c  was 6.2%. Denies headache, blurred vision, chest pain, SOB, n/v, ruq/epigastric pain.

## 2018-05-28 NOTE — OB PROVIDER H&P - ATTENDING COMMENTS
Admit. IVF, BP management, r/o preeclampsia, monitor labor progress, continuous materno-fetal monitoring.

## 2018-05-28 NOTE — OB PROVIDER H&P - ASSESSMENT
37y  at 38w0d dated by 24week sonayleen, AMA, GDMA2, with now severe range BPs, s/p labetalol 20mg IVP, in labor, r/o GHTN vs preeclampsia.  Admit to labor and delivery: clear liquid diet, IV hydration, continuous EFM/toco, pain management PRN, admission and preeclamptic labs.   FS Q4H.  Epidural for pain management. 37y  at 38w0d dated by 24week sono, AMA, GDMA2, with now severe range BPs, s/p labetalol 20mg IVP, in labor, r/o GHTN vs preeclampsia.  Admit to labor and delivery: NPO, IV hydration, continuous EFM/toco, pain management PRN, admission and preeclamptic labs.   FS Q4H.  Epidural for pain management.

## 2018-05-28 NOTE — OB PROVIDER H&P - NSHPLABSRESULTS_GEN_ALL_CORE
24.3wks - vtx, 3vc, ant placenta, mvp 47mm, efw 788g (59th %ile), anatomy WNl, CL 39mm   30.3wks - vtx, fundal placenta, mvp 60mm, BPP 8/8   31.3wks - cephalic, ant placenta, mvp 49mm, efw 2036g (63rd %ile)  32.3wks - cephalic, ant placenta, mvp 47mm, BPP 8/8   33.3wks - cephalic, ant placenta, mvp 47mm, BPP 8/8  35.3wks - cephalic, ant placenta, mvp 45mm, efw 2627g (44th %ile), BPP 8/8   36.3wks - cephalic, ant placenta, mvp 43mm, BPP 8/8

## 2018-05-28 NOTE — OB PROVIDER H&P - NS_OBGYNHISTORY_OBGYN_ALL_OB_FT
OBHx:   FT x2 largest [id number] ( and ) no complications, sabx1 with d&c and etopx2 with d&cx2    GYNHx: h/o chlamydia. Denies h/o ovarian cysts, abnormal paps, fibroids, other STIs

## 2018-05-28 NOTE — OB PROVIDER H&P - NSNYCREQUIREMENTS_OBGYN_ALL_OB
ADD [facility] REQUIREMENTS SECTION ([facility]/J/SI)...

## 2018-05-29 ENCOUNTER — RESULT REVIEW (OUTPATIENT)
Age: 38
End: 2018-05-29

## 2018-05-29 LAB
ALBUMIN SERPL ELPH-MCNC: 2.7 G/DL — LOW (ref 3.5–5.2)
ALBUMIN SERPL ELPH-MCNC: 3.1 G/DL — LOW (ref 3.5–5.2)
ALP SERPL-CCNC: 622 U/L — HIGH (ref 30–115)
ALP SERPL-CCNC: 764 U/L — HIGH (ref 30–115)
ALT FLD-CCNC: 7 U/L — SIGNIFICANT CHANGE UP (ref 0–41)
ALT FLD-CCNC: 8 U/L — SIGNIFICANT CHANGE UP (ref 0–41)
AMPHET UR-MCNC: NEGATIVE — SIGNIFICANT CHANGE UP
ANION GAP SERPL CALC-SCNC: 11 MMOL/L — SIGNIFICANT CHANGE UP (ref 7–14)
ANION GAP SERPL CALC-SCNC: 15 MMOL/L — HIGH (ref 7–14)
APPEARANCE UR: (no result)
AST SERPL-CCNC: 14 U/L — SIGNIFICANT CHANGE UP (ref 0–41)
AST SERPL-CCNC: 14 U/L — SIGNIFICANT CHANGE UP (ref 0–41)
BACTERIA # UR AUTO: (no result) /HPF
BARBITURATES UR SCN-MCNC: NEGATIVE — SIGNIFICANT CHANGE UP
BENZODIAZ UR-MCNC: NEGATIVE — SIGNIFICANT CHANGE UP
BILIRUB SERPL-MCNC: 0.2 MG/DL — SIGNIFICANT CHANGE UP (ref 0.2–1.2)
BILIRUB SERPL-MCNC: 0.2 MG/DL — SIGNIFICANT CHANGE UP (ref 0.2–1.2)
BILIRUB UR-MCNC: NEGATIVE — SIGNIFICANT CHANGE UP
BLD GP AB SCN SERPL QL: SIGNIFICANT CHANGE UP
BUN SERPL-MCNC: 11 MG/DL — SIGNIFICANT CHANGE UP (ref 10–20)
BUN SERPL-MCNC: 7 MG/DL — LOW (ref 10–20)
CALCIUM SERPL-MCNC: 8.5 MG/DL — SIGNIFICANT CHANGE UP (ref 8.5–10.1)
CALCIUM SERPL-MCNC: 9.3 MG/DL — SIGNIFICANT CHANGE UP (ref 8.5–10.1)
CHLORIDE SERPL-SCNC: 100 MMOL/L — SIGNIFICANT CHANGE UP (ref 98–110)
CHLORIDE SERPL-SCNC: 102 MMOL/L — SIGNIFICANT CHANGE UP (ref 98–110)
CO2 SERPL-SCNC: 21 MMOL/L — SIGNIFICANT CHANGE UP (ref 17–32)
CO2 SERPL-SCNC: 22 MMOL/L — SIGNIFICANT CHANGE UP (ref 17–32)
COCAINE METAB.OTHER UR-MCNC: NEGATIVE — SIGNIFICANT CHANGE UP
COLOR SPEC: SIGNIFICANT CHANGE UP
CREAT SERPL-MCNC: 0.5 MG/DL — LOW (ref 0.7–1.5)
CREAT SERPL-MCNC: <0.5 MG/DL — LOW (ref 0.7–1.5)
DIFF PNL FLD: (no result)
EPI CELLS # UR: (no result) /HPF
GLUCOSE SERPL-MCNC: 116 MG/DL — HIGH (ref 70–99)
GLUCOSE SERPL-MCNC: 77 MG/DL — SIGNIFICANT CHANGE UP (ref 70–99)
GLUCOSE UR QL: NEGATIVE MG/DL — SIGNIFICANT CHANGE UP
HCT VFR BLD CALC: 34.1 % — LOW (ref 37–47)
HGB BLD-MCNC: 10.9 G/DL — LOW (ref 12–16)
KETONES UR-MCNC: NEGATIVE — SIGNIFICANT CHANGE UP
LDH SERPL L TO P-CCNC: 184 — SIGNIFICANT CHANGE UP (ref 50–242)
LDH SERPL L TO P-CCNC: 211 — SIGNIFICANT CHANGE UP (ref 50–242)
LEUKOCYTE ESTERASE UR-ACNC: (no result)
MAGNESIUM SERPL-MCNC: 4 MG/DL — CRITICAL HIGH (ref 1.8–2.4)
MAGNESIUM SERPL-MCNC: 4.5 MG/DL — CRITICAL HIGH (ref 1.8–2.4)
MCHC RBC-ENTMCNC: 23.4 PG — LOW (ref 27–31)
MCHC RBC-ENTMCNC: 32 G/DL — SIGNIFICANT CHANGE UP (ref 32–37)
MCV RBC AUTO: 73.3 FL — LOW (ref 81–99)
METHADONE UR-MCNC: NEGATIVE — SIGNIFICANT CHANGE UP
NITRITE UR-MCNC: NEGATIVE — SIGNIFICANT CHANGE UP
NRBC # BLD: 0 /100 WBCS — SIGNIFICANT CHANGE UP (ref 0–0)
OPIATES UR-MCNC: NEGATIVE — SIGNIFICANT CHANGE UP
PCP SPEC-MCNC: SIGNIFICANT CHANGE UP
PH UR: 6 — SIGNIFICANT CHANGE UP (ref 5–8)
PLATELET # BLD AUTO: 242 K/UL — SIGNIFICANT CHANGE UP (ref 130–400)
POTASSIUM SERPL-MCNC: 4.2 MMOL/L — SIGNIFICANT CHANGE UP (ref 3.5–5)
POTASSIUM SERPL-MCNC: 4.5 MMOL/L — SIGNIFICANT CHANGE UP (ref 3.5–5)
POTASSIUM SERPL-SCNC: 4.2 MMOL/L — SIGNIFICANT CHANGE UP (ref 3.5–5)
POTASSIUM SERPL-SCNC: 4.5 MMOL/L — SIGNIFICANT CHANGE UP (ref 3.5–5)
PRENATAL SYPHILIS TEST: SIGNIFICANT CHANGE UP
PROPOXYPHENE QUALITATIVE URINE RESULT: NEGATIVE — SIGNIFICANT CHANGE UP
PROT SERPL-MCNC: 5.6 G/DL — LOW (ref 6–8)
PROT SERPL-MCNC: 6.4 G/DL — SIGNIFICANT CHANGE UP (ref 6–8)
PROT UR-MCNC: 30 MG/DL
RBC # BLD: 4.65 M/UL — SIGNIFICANT CHANGE UP (ref 4.2–5.4)
RBC # FLD: 14.7 % — HIGH (ref 11.5–14.5)
RBC CASTS # UR COMP ASSIST: >50 /HPF
SODIUM SERPL-SCNC: 133 MMOL/L — LOW (ref 135–146)
SODIUM SERPL-SCNC: 138 MMOL/L — SIGNIFICANT CHANGE UP (ref 135–146)
SP GR SPEC: 1.02 — SIGNIFICANT CHANGE UP (ref 1.01–1.03)
TYPE + AB SCN PNL BLD: SIGNIFICANT CHANGE UP
URATE SERPL-MCNC: 4.6 MG/DL — SIGNIFICANT CHANGE UP (ref 2.5–7)
URATE SERPL-MCNC: 4.6 MG/DL — SIGNIFICANT CHANGE UP (ref 2.5–7)
UROBILINOGEN FLD QL: 0.2 MG/DL — SIGNIFICANT CHANGE UP (ref 0.2–0.2)
WBC # BLD: 16.06 K/UL — HIGH (ref 4.8–10.8)
WBC # FLD AUTO: 16.06 K/UL — HIGH (ref 4.8–10.8)
WBC UR QL: (no result) /HPF

## 2018-05-29 RX ORDER — IBUPROFEN 200 MG
600 TABLET ORAL EVERY 6 HOURS
Qty: 0 | Refills: 0 | Status: DISCONTINUED | OUTPATIENT
Start: 2018-05-29 | End: 2018-05-31

## 2018-05-29 RX ORDER — HYDRALAZINE HCL 50 MG
5 TABLET ORAL ONCE
Qty: 0 | Refills: 0 | Status: COMPLETED | OUTPATIENT
Start: 2018-05-29 | End: 2018-05-29

## 2018-05-29 RX ORDER — MAGNESIUM SULFATE 500 MG/ML
4 VIAL (ML) INJECTION ONCE
Qty: 0 | Refills: 0 | Status: COMPLETED | OUTPATIENT
Start: 2018-05-29 | End: 2018-05-29

## 2018-05-29 RX ORDER — LABETALOL HCL 100 MG
80 TABLET ORAL ONCE
Qty: 0 | Refills: 0 | Status: DISCONTINUED | OUTPATIENT
Start: 2018-05-29 | End: 2018-05-31

## 2018-05-29 RX ORDER — HYDRALAZINE HCL 50 MG
10 TABLET ORAL ONCE
Qty: 0 | Refills: 0 | Status: DISCONTINUED | OUTPATIENT
Start: 2018-05-29 | End: 2018-05-29

## 2018-05-29 RX ORDER — MAGNESIUM SULFATE 500 MG/ML
2 VIAL (ML) INJECTION
Qty: 40 | Refills: 0 | Status: DISCONTINUED | OUTPATIENT
Start: 2018-05-29 | End: 2018-05-31

## 2018-05-29 RX ORDER — OXYCODONE AND ACETAMINOPHEN 5; 325 MG/1; MG/1
2 TABLET ORAL EVERY 6 HOURS
Qty: 0 | Refills: 0 | Status: DISCONTINUED | OUTPATIENT
Start: 2018-05-29 | End: 2018-05-31

## 2018-05-29 RX ADMIN — Medication 600 MILLIGRAM(S): at 01:45

## 2018-05-29 RX ADMIN — Medication 5 MILLIGRAM(S): at 01:19

## 2018-05-29 RX ADMIN — OXYCODONE AND ACETAMINOPHEN 2 TABLET(S): 5; 325 TABLET ORAL at 08:20

## 2018-05-29 RX ADMIN — Medication 300 GRAM(S): at 02:15

## 2018-05-29 RX ADMIN — Medication 5 MILLIGRAM(S): at 02:03

## 2018-05-29 RX ADMIN — Medication 50 GM/HR: at 02:56

## 2018-05-29 RX ADMIN — OXYCODONE AND ACETAMINOPHEN 2 TABLET(S): 5; 325 TABLET ORAL at 16:41

## 2018-05-29 NOTE — PROGRESS NOTE ADULT - SUBJECTIVE AND OBJECTIVE BOX
PGY2 Note    Subjective:   Pt evaluated at bedside for magnesium check. She denies visual disturbances, headache and right upper quadrant pain. Also denies nausea/vomiting/chest pain/epigastric pain/shortness of breath. Pain well controlled.     Objective:   T(F): 98.2 ( @ 01:09), Max: 98.8 ( @ 22:07)  HR: 88 ( @ 05:56)  BP: 103/51 ( @ 05:56) (/ - /108)  RR: 20 ( @ 23:13)  SpO2: 99% ( @ 05:54)  Vital Signs Last 24 Hrs  BP: 103/51 (29 May 2018 05:56) (103/51 - 207/108)     @ 07:01  -   @ 05:58  --------------------------------------------------------  IN: 375 mL / OUT: 300 mL / NET: 75 mL    Gen: NAD, AAOx3  CV: RRR, no M/R/G  Pulm: CTAB, no R/R/W  Abd: soft, gravid, nontender, no rebound or guarding, no epigastric tenderness, liver nonpalpable +BS.   : [place]   Ext: +1 edema georgette, SCDs in place  Neuro: Reflexes 2+ bilaterally upper and lower    Medications:  citric acid/sodium citrate Solution 15 milliLiter(s) Oral every 4 hours  ibuprofen  Tablet 600 milliGRAM(s) Oral every 6 hours PRN  labetalol Injectable 80 milliGRAM(s) IV Push once  lactated ringers Bolus 125 milliLiter(s) IV Bolus once  lactated ringers. 1000 milliLiter(s) IV Continuous <Continuous>  magnesium sulfate Infusion 2 Gm/Hr IV Continuous <Continuous>  oxytocin Infusion 125 milliUNIT(s)/Min IV Continuous <Continuous>    No Known Allergies    Labs:                        11.8   10. )-----------( 247      ( 28 May 2018 23:19 )             37.0         138  |  102  |  11  ----------------------------<  77  4.5   |  21  |  0.5<L>    Ca    9.3      28 May 2018 23:19  TPro  6.4  /  Alb  3.1<L>  /  TBili  0.2  /  DBili  x   /  AST  14  /  ALT  8   /  AlkPhos  764<H>    Uric Acid, Serum: 4.6 mg/dL ( @ 23:19)    Assessment:  37y  s/p  at 38w, PPD0, AMA, GDMA2, s/p labetalol IVP 20mg/40mg/80mg in labor and hydralazine IVP 5mg/5mg immediately postpartum, with pre-eclampsia with severe features now on Mg    Plan:  -Continue magnesium until 00:49 24h from delivery  -f/u pending mag level at 0800  -Neuro checks q2h  -Cont to monitor BPs and I/Os  -Pain management prn  -Cont seizure precautions  -Cont efm/toco\ PGY2 Note    Subjective:   Pt evaluated at bedside for magnesium check. She denies visual disturbances, headache and right upper quadrant pain. Also denies nausea/vomiting/chest pain/epigastric pain/shortness of breath. Pain well controlled.     Objective:   T(F): 98.2 ( @ 01:09), Max: 98.8 ( @ 22:07)  HR: 88 ( @ 05:56)  BP: 103/51 ( @ 05:56) (/ - /108)  RR: 20 ( @ 23:13)  SpO2: 99% ( @ 05:54)  Vital Signs Last 24 Hrs  BP: 103/51 (29 May 2018 05:56) (103/51 - 207/108)     @ 07:01  -   @ 05:58  --------------------------------------------------------  IN: 375 mL / OUT: 300 mL / NET: 75 mL    Gen: NAD, AAOx3  CV: RRR, no M/R/G  Pulm: CTAB, no R/R/W  Abd: soft, gravid, nontender, no rebound or guarding, no epigastric tenderness, liver nonpalpable +BS.   : [place]   Ext: +1 edema georgette, SCDs in place  Neuro: Reflexes 2+ bilaterally upper and lower    Medications:  citric acid/sodium citrate Solution 15 milliLiter(s) Oral every 4 hours  ibuprofen  Tablet 600 milliGRAM(s) Oral every 6 hours PRN  labetalol Injectable 80 milliGRAM(s) IV Push once  lactated ringers Bolus 125 milliLiter(s) IV Bolus once  lactated ringers. 1000 milliLiter(s) IV Continuous <Continuous>  magnesium sulfate Infusion 2 Gm/Hr IV Continuous <Continuous>  oxytocin Infusion 125 milliUNIT(s)/Min IV Continuous <Continuous>    No Known Allergies    Labs:                        11.8   10. )-----------( 247      ( 28 May 2018 23:19 )             37.0         138  |  102  |  11  ----------------------------<  77  4.5   |  21  |  0.5<L>    Ca    9.3      28 May 2018 23:19  TPro  6.4  /  Alb  3.1<L>  /  TBili  0.2  /  DBili  x   /  AST  14  /  ALT  8   /  AlkPhos  764<H>    Uric Acid, Serum: 4.6 mg/dL ( @ 23:19)    Assessment:  37y  s/p  at 38w, PPD0, AMA, GDMA2, s/p labetalol IVP 20mg/40mg/80mg in labor and hydralazine IVP 5mg/5mg immediately postpartum, with pre-eclampsia with severe features now on Mg    Plan:  -Continue magnesium started at 0215, will continue until  00:49, 24h from delivery  -f/u pending mag level at 0800  -Neuro checks q2h  -Cont to monitor BPs and I/Os  -Pain management prn  -Cont seizure precautions  -Cont efm/toco    Dr. Betancourt and Dr. Wan aware

## 2018-05-29 NOTE — PROGRESS NOTE ADULT - SUBJECTIVE AND OBJECTIVE BOX
PGY1 Note     Pt evaluated at bedside for magnesium check. She denies visual disturbances, headache and right upper quadrant pain. Also denies nausea/vomiting/chest pain/epigastric pain/shortness of breath. Pain well controlled.     O:  T(C): 36.6 (18 @ 08:00), Max: 36.8 (18 @ 01:09)  HR: 74 (18 @ 11:46) (60 - 108)  BP: 114/59 (18 @ 11:42) (99/52 - 207/108)  SpO2: 100% (18 @ 11:51) (93% - 100%)  Wt(kg): --  Vital Signs Last 24 Hrs  BP: 114/59 (29 May 2018 11:42) (99/52 - 207/108)  Daily Height in cm: 172.72 (28 May 2018 23:13)    Daily Baby A: Weight (gm) Delivery: 2700 (29 May 2018 00:47)     @ 07:01  -   @ 07:00  --------------------------------------------------------  IN: 625 mL / OUT: 300 mL / NET: 325 mL     @ 07:01  -   @ 12:03  --------------------------------------------------------  IN: 250 mL / OUT: 600 mL / NET: -350 mL    UO 300cc/h    Gen: NAD, AAOx3  CV: RRR, no M/R/G  Pulm: CTAB, no R/R/W  Abd: soft, nontender, no rebound or guarding, RUQ or no epigastric tenderness, +BS.   : Mora , minimal lochia  Ext: +1 edema georgette, SCDs in place  Neuro: Reflexes 2+ in bilateral upper extremities    citric acid/sodium citrate Solution 15 milliLiter(s) Oral every 4 hours  ibuprofen  Tablet 600 milliGRAM(s) Oral every 6 hours PRN  labetalol Injectable 80 milliGRAM(s) IV Push once  lactated ringers Bolus 125 milliLiter(s) IV Bolus once  lactated ringers. 1000 milliLiter(s) IV Continuous <Continuous>  magnesium sulfate Infusion 2 Gm/Hr IV Continuous <Continuous>  oxyCODONE    5 mG/acetaminophen 325 mG 2 Tablet(s) Oral every 6 hours PRN  oxytocin Infusion 125 milliUNIT(s)/Min IV Continuous <Continuous>    No Known Allergies                            10.9   16.06 )-----------( 242      ( 29 May 2018 09:58 )             34.1         133<L>  |  100  |  7<L>  ----------------------------<  116<H>  4.2   |  22  |  <0.5<L>    Ca    8.5      29 May 2018 09:58  Mg     4.0         TPro  5.6<L>  /  Alb  2.7<L>  /  TBili  0.2  /  DBili  x   /  AST  14  /  ALT  7   /  AlkPhos  622<H>        urine pr/cr pending    Assessment:  37y  s/p  at 38w, PPD0, AMA, GDMA2, s/p labetalol IVP 20mg/40mg/80mg in labor and hydralazine IVP 5mg/5mg immediately postpartum, with pre-eclampsia with severe features now on Mg. BPs now wnl, asymptomatic at this time.    Plan:  -Continue magnesium started at 0215, will continue until  00:49, 24h from delivery  -f/u pending PEL and mag level  -Neuro checks q2h  -Cont to monitor BPs and I/Os  -Pain management prn  -Cont seizure precautions  -Cont efm/toco    Dr. Keller and Dr. Aguirre to be made aware. PGY1 Note     Pt evaluated at bedside for magnesium check. She denies visual disturbances, headache and right upper quadrant pain. Also denies nausea/vomiting/chest pain/epigastric pain/shortness of breath. Pain well controlled.     O:  T(C): 36.6 (18 @ 08:00), Max: 36.8 (18 @ 01:09)  HR: 74 (18 @ 11:46) (60 - 108)  BP: 114/59 (18 @ 11:42) (99/52 - 207/108)  SpO2: 100% (18 @ 11:51) (93% - 100%)  Wt(kg): --  Vital Signs Last 24 Hrs  BP: 114/59 (29 May 2018 11:42) (99/52 - 207/108)  Daily Height in cm: 172.72 (28 May 2018 23:13)    Daily Baby A: Weight (gm) Delivery: 2700 (29 May 2018 00:47)     @ 07:01  -   @ 07:00  --------------------------------------------------------  IN: 625 mL / OUT: 300 mL / NET: 325 mL     @ 07:01  -   @ 12:03  --------------------------------------------------------  IN: 250 mL / OUT: 600 mL / NET: -350 mL    UO 300cc/h    Gen: NAD, AAOx3  CV: RRR, no M/R/G  Pulm: CTAB, no R/R/W  Abd: soft, nontender, no rebound or guarding, RUQ or no epigastric tenderness, +BS.   : Mora , minimal lochia  Ext: +1 edema georgette, SCDs in place  Neuro: Reflexes 2+ in bilateral upper extremities    citric acid/sodium citrate Solution 15 milliLiter(s) Oral every 4 hours  ibuprofen  Tablet 600 milliGRAM(s) Oral every 6 hours PRN  labetalol Injectable 80 milliGRAM(s) IV Push once  lactated ringers Bolus 125 milliLiter(s) IV Bolus once  lactated ringers. 1000 milliLiter(s) IV Continuous <Continuous>  magnesium sulfate Infusion 2 Gm/Hr IV Continuous <Continuous>  oxyCODONE    5 mG/acetaminophen 325 mG 2 Tablet(s) Oral every 6 hours PRN  oxytocin Infusion 125 milliUNIT(s)/Min IV Continuous <Continuous>    No Known Allergies                            10.9   16.06 )-----------( 242      ( 29 May 2018 09:58 )             34.1         133<L>  |  100  |  7<L>  ----------------------------<  116<H>  4.2   |  22  |  <0.5<L>    Ca    8.5      29 May 2018 09:58  Mg     4.0      @ 1000    TPro  5.6<L>  /  Alb  2.7<L>  /  TBili  0.2  /  DBili  x   /  AST  14  /  ALT  7   /  AlkPhos  622<H>        urine pr/cr pending    Assessment:  37y  s/p  at 38w, PPD0, AMA, GDMA2, s/p labetalol IVP 20mg/40mg/80mg in labor and hydralazine IVP 5mg/5mg immediately postpartum, with pre-eclampsia with severe features now on Mg. BPs now wnl, asymptomatic at this time.    Plan:  -Continue magnesium started at 0215, will continue until  00:49, 24h from delivery  -f/u pending PEL and mag level  -Neuro checks q2h  -Cont to monitor BPs and I/Os  -Pain management prn  -Cont seizure precautions  -Cont efm/toco    Dr. Keller and Dr. Aguirre to be made aware. PGY1 Note     Pt evaluated at bedside for magnesium check. She denies visual disturbances, headache and right upper quadrant pain. Also denies nausea/vomiting/chest pain/epigastric pain/shortness of breath. Pain well controlled.     O:  T(C): 36.6 (18 @ 08:00), Max: 36.8 (18 @ 01:09)  HR: 74 (18 @ 11:46) (60 - 108)  BP: 114/59 (18 @ 11:42) (99/52 - 207/108)  SpO2: 100% (18 @ 11:51) (93% - 100%)  Wt(kg): --  Vital Signs Last 24 Hrs  BP: 114/59 (29 May 2018 11:42) (99/52 - 207/108)  Daily Height in cm: 172.72 (28 May 2018 23:13)    Daily Baby A: Weight (gm) Delivery: 2700 (29 May 2018 00:47)     @ 07:01  -   @ 07:00  --------------------------------------------------------  IN: 625 mL / OUT: 300 mL / NET: 325 mL     @ 07:01  -   @ 12:03  --------------------------------------------------------  IN: 250 mL / OUT: 600 mL / NET: -350 mL    UO 300cc/h    Gen: NAD, AAOx3  CV: RRR, no M/R/G  Pulm: CTAB, no R/R/W  Abd: soft, nontender, no rebound or guarding, RUQ or no epigastric tenderness, +BS.   : Mora , minimal lochia  Ext: +1 edema georgette, SCDs in place  Neuro: Reflexes 2+ in bilateral upper extremities    citric acid/sodium citrate Solution 15 milliLiter(s) Oral every 4 hours  ibuprofen  Tablet 600 milliGRAM(s) Oral every 6 hours PRN  labetalol Injectable 80 milliGRAM(s) IV Push once  lactated ringers Bolus 125 milliLiter(s) IV Bolus once  lactated ringers. 1000 milliLiter(s) IV Continuous <Continuous>  magnesium sulfate Infusion 2 Gm/Hr IV Continuous <Continuous>  oxyCODONE    5 mG/acetaminophen 325 mG 2 Tablet(s) Oral every 6 hours PRN  oxytocin Infusion 125 milliUNIT(s)/Min IV Continuous <Continuous>    No Known Allergies                            10.9   16.06 )-----------( 242      ( 29 May 2018 09:58 )             34.1         133<L>  |  100  |  7<L>  ----------------------------<  116<H>  4.2   |  22  |  <0.5<L>    Ca    8.5      29 May 2018 09:58  Mg     4.0      @ 1000    TPro  5.6<L>  /  Alb  2.7<L>  /  TBili  0.2  /  DBili  x   /  AST  14  /  ALT  7   /  AlkPhos  622<H>        urine pr/cr pending    Assessment:  37y  s/p  at 38w, PPD0, AMA, GDMA2, s/p labetalol IVP 20mg/40mg/80mg in labor and hydralazine IVP 5mg/5mg immediately postpartum, with pre-eclampsia with severe features now on Mg. BPs now wnl, asymptomatic at this time.     Plan:  -Continue magnesium started at 0215, will continue until  00:49, 24h from delivery  -f/u utpcr, Next mag 1600  -Neuro checks q2h  -Cont to monitor BPs and I/Os  -Pain management prn  -Cont seizure precautions  -Cont efm/toco    Dr. Keller and Dr. Aguirre to be made aware.

## 2018-05-29 NOTE — OB PROVIDER DELIVERY SUMMARY - NSPROVIDERDELIVERYNOTE_OBGYN_ALL_OB_FT
Patient was fully dilated and pushing. Fetal head was OA and restituted to LOT. Anterior and posterior shoulders delivered, followed by the remaining body atraumatically. Delayed cord clamping was performed, the infant was handed to the mother, then the cord was clamped and cut. Placenta delivered intact with membranes. Cervix, vagina, and perineum were inspected, no lacerations noted. Viable female infant delivered.    Dr. Xiong and Dr. Wan were present for the delivery. Patient was fully dilated and pushing. Fetal head was OA and restituted to LOT. Anterior and posterior shoulders delivered atruatically, followed by the remaining body atraumatically. Delayed cord clamping was performed, the infant was handed to the mother, then the cord was clamped and cut. Placenta delivered intact with membranes. Cervix, vagina, and perineum were inspected, no lacerations noted. Viable female infant delivered.    Dr. Xiong and Dr. Wan were present for the delivery.

## 2018-05-29 NOTE — PROGRESS NOTE ADULT - ASSESSMENT
37y  s/p  at 38w, PPD0, AMA, GDMA2, s/p labetalol IVP 20mg/40mg/80mg in labor and hydralazine IVP 5mg/5mg immediately postpartum, with pre-eclampsia with severe features now on Mg    Plan:  -C/wmagnesium until 24h from delivery  -f/u pending PEL and mag level  -Neuro checks q2h  -Cont to monitor BPs and I/Os  -Pain management prn  -C/w seizure precautions    Dr. Betancourt aware, Dr. Bowman to be made aware

## 2018-05-29 NOTE — PROGRESS NOTE ADULT - SUBJECTIVE AND OBJECTIVE BOX
Subjective:   Pt evaluated at bedside for magnesium check. She denies visual disturbances, headache and right upper quadrant pain. Also denies nausea/vomiting/chest pain/epigastric pain/shortness of breath. Pain well controlled.     Objective:   T(F): 97.88 ( @ 08:00), Max: 97.88 ( @ 08:00)  HR: 69 ( @ 19:06)  BP: 111/58 ( @ 18:56) (94/52 - 157/65)  RR: --  SpO2: 97% ( @ 19:06)  Vital Signs Last 24 Hrs  BP: 111/58 (29 May 2018 18:56) (94/52 - 207/108)     @ 07:01  -   @ 19:09  --------------------------------------------------------  IN: 1775 mL / OUT: 2425 mL / NET: -650 mL    Gen: NAD, AAOx3  CV: RRR, no M/R/G  Pulm: CTAB, no R/R/W  Abd: soft, gravid, nontender, no rebound or guarding, no epigastric tenderness, liver nonpalpable +BS.   : Mora   Ext: +1 edema georgette, SCDs in place  Neuro: Reflexes 2+ bilaterally upper and lower    Medications:  citric acid/sodium citrate Solution 15 milliLiter(s) Oral every 4 hours  ibuprofen  Tablet 600 milliGRAM(s) Oral every 6 hours PRN  labetalol Injectable 80 milliGRAM(s) IV Push once  lactated ringers Bolus 125 milliLiter(s) IV Bolus once  lactated ringers. 1000 milliLiter(s) IV Continuous <Continuous>  magnesium sulfate Infusion 2 Gm/Hr IV Continuous <Continuous>  oxyCODONE    5 mG/acetaminophen 325 mG 2 Tablet(s) Oral every 6 hours PRN  oxytocin Infusion 125 milliUNIT(s)/Min IV Continuous <Continuous>    No Known Allergies    Labs:                        10.9   16.06 )-----------( 242      ( 29 May 2018 09:58 )             34.1         133<L>  |  100  |  7<L>  ----------------------------<  116<H>  4.2   |  22  |  <0.5<L>    Ca    8.5      29 May 2018 09:58  Mg     4.5         TPro  5.6<L>  /  Alb  2.7<L>  /  TBili  0.2  /  DBili  x   /  AST  14  /  ALT  7   /  AlkPhos  622<H>    Magnesium, Serum: 4.5 mg/dL ( @ 16:55)  Uric Acid, Serum: 4.6 mg/dL ( @ 09:58)  Magnesium, Serum: 4.0 mg/dL ( @ 09:58)  Uric Acid, Serum: 4.6 mg/dL ( @ 23:19)    Assessment:  S/P , PPD0, GDMA2 S/P multiple labetalol IV pushes and S/P hydralazine IV, preeclampsia with severe features, on Mg+, currently doing well    Plan:  - c/w Mg+ until 49   - f/u Upr/cr, Mg+   - neuro checks q2h, strict I/Os   - pain management prn  - cont clears  - f/u UOP Subjective:   Pt evaluated at bedside for magnesium check. She denies visual disturbances, headache and right upper quadrant pain. Also denies nausea/vomiting/chest pain/epigastric pain/shortness of breath. Pain well controlled.     Objective:   T(F): 97.88 ( @ 08:00), Max: 97.88 ( @ 08:00)  HR: 69 ( @ 19:06)  BP: 111/58 ( @ 18:56) (94/52 - 157/65)  RR: --  SpO2: 97% ( @ 19:06)  Vital Signs Last 24 Hrs  BP: 111/58 (29 May 2018 18:56) (94/52 - 207/108)     @ 07:01  -   @ 19:09  --------------------------------------------------------  IN: 1775 mL / OUT: 2425 mL / NET: -650 mL    Gen: NAD, AAOx3  CV: RRR, no M/R/G  Pulm: CTAB, no R/R/W  Abd: soft, gravid, nontender, no rebound or guarding, no epigastric tenderness, liver nonpalpable +BS.   : Mora   Ext: +1 edema georgette, SCDs in place  Neuro: Reflexes 2+ bilaterally upper and lower    Medications:  citric acid/sodium citrate Solution 15 milliLiter(s) Oral every 4 hours  ibuprofen  Tablet 600 milliGRAM(s) Oral every 6 hours PRN  labetalol Injectable 80 milliGRAM(s) IV Push once  lactated ringers Bolus 125 milliLiter(s) IV Bolus once  lactated ringers. 1000 milliLiter(s) IV Continuous <Continuous>  magnesium sulfate Infusion 2 Gm/Hr IV Continuous <Continuous>  oxyCODONE    5 mG/acetaminophen 325 mG 2 Tablet(s) Oral every 6 hours PRN  oxytocin Infusion 125 milliUNIT(s)/Min IV Continuous <Continuous>    No Known Allergies    Labs:                        10.9   16.06 )-----------( 242      ( 29 May 2018 09:58 )             34.1         133<L>  |  100  |  7<L>  ----------------------------<  116<H>  4.2   |  22  |  <0.5<L>    Ca    8.5      29 May 2018 09:58  Mg     4.5         TPro  5.6<L>  /  Alb  2.7<L>  /  TBili  0.2  /  DBili  x   /  AST  14  /  ALT  7   /  AlkPhos  622<H>    Magnesium, Serum: 4.5 mg/dL ( @ 16:55)  Uric Acid, Serum: 4.6 mg/dL ( @ 09:58)  Magnesium, Serum: 4.0 mg/dL ( @ 09:58)  Uric Acid, Serum: 4.6 mg/dL ( @ 23:19)    Assessment:  S/P , PPD0, GDMA2 S/P multiple labetalol IV pushes and S/P hydralazine IV, preeclampsia with severe features, on Mg+, currently doing well    Plan:  - c/w Mg+ until 49   - f/u Upr/cr, Mg+   - neuro checks q2h, strict I/Os   - pain management prn  - cont reg diet per PMD  - f/u UOP Subjective:   Pt evaluated at bedside for magnesium check. She denies visual disturbances, headache and right upper quadrant pain. Also denies nausea/vomiting/chest pain/epigastric pain/shortness of breath. Pain well controlled.     Objective:   T(F): 97.88 ( @ 08:00), Max: 97.88 ( @ 08:00)  HR: 69 ( @ 19:06)  BP: 111/58 ( @ 18:56) (94/52 - 157/65)  RR: --  SpO2: 97% ( @ 19:06)  Vital Signs Last 24 Hrs  BP: 111/58 (29 May 2018 18:56) (94/52 - 207/108)     @ 07:01  -   @ 19:09  --------------------------------------------------------  IN: 1775 mL / OUT: 2425 mL / NET: -650 mL    Gen: NAD, AAOx3  CV: RRR, no M/R/G  Pulm: CTAB, no R/R/W  Abd: soft, gravid, nontender, no rebound or guarding, no epigastric tenderness, liver nonpalpable +BS.   : Mora   Ext: +1 edema georgette, SCDs in place  Neuro: Reflexes 2+ bilaterally upper and lower    Medications:  citric acid/sodium citrate Solution 15 milliLiter(s) Oral every 4 hours  ibuprofen  Tablet 600 milliGRAM(s) Oral every 6 hours PRN  labetalol Injectable 80 milliGRAM(s) IV Push once  lactated ringers Bolus 125 milliLiter(s) IV Bolus once  lactated ringers. 1000 milliLiter(s) IV Continuous <Continuous>  magnesium sulfate Infusion 2 Gm/Hr IV Continuous <Continuous>  oxyCODONE    5 mG/acetaminophen 325 mG 2 Tablet(s) Oral every 6 hours PRN  oxytocin Infusion 125 milliUNIT(s)/Min IV Continuous <Continuous>    No Known Allergies    Labs:                        10.9   16.06 )-----------( 242      ( 29 May 2018 09:58 )             34.1         133<L>  |  100  |  7<L>  ----------------------------<  116<H>  4.2   |  22  |  <0.5<L>    Ca    8.5      29 May 2018 09:58  Mg     4.5         TPro  5.6<L>  /  Alb  2.7<L>  /  TBili  0.2  /  DBili  x   /  AST  14  /  ALT  7   /  AlkPhos  622<H>    Magnesium, Serum: 4.5 mg/dL ( @ 16:55)  Uric Acid, Serum: 4.6 mg/dL ( @ 09:58)  Magnesium, Serum: 4.0 mg/dL ( @ 09:58)  Uric Acid, Serum: 4.6 mg/dL ( @ 23:19)    Assessment:  S/P , PPD0, GDMA2 S/P multiple labetalol IV pushes and S/P hydralazine IV, preeclampsia with severe features, on Mg+, currently doing well    Plan:  - c/w Mg+ until 49   - f/u Upr/cr   - neuro checks q2h, strict I/Os   - pain management prn  - cont reg diet per PMD  - f/u UOP

## 2018-05-29 NOTE — PROGRESS NOTE ADULT - SUBJECTIVE AND OBJECTIVE BOX
PGY 4 Note    Patient examined at bedside for prolonged decel (130/mod/prlonged decel down to 60 bpm lasting 9 min with gradual increase to baseline after resuscitation (on right lateral decubitus with oxygen in place; left lateral not tolerated). VE: 7/80/-2, no membranes felt. Patient uncooperative with vaginal exams and  repositioning therefore ISE could not be placed. FHR was subsequently detected on external tocometer thereafter.     Thoroughly counselled patient on the importance of resuscitation maneuvers in the benefit of improving the FHR, now Cat I. Patient continuing to ask for pain management and declined epidural prior. Informed her that we would require a Cat I tracing for 20-30 minutes prior to administering Stadol. Repeat BP: 167/80 after cuff adjusted; will wait for one more BP prior to administering Hydralazine.    Dr. Wan by bedside for exam.

## 2018-05-29 NOTE — PROGRESS NOTE ADULT - SUBJECTIVE AND OBJECTIVE BOX
Subjective:   Pt evaluated at bedside for magnesium check. She denies visual disturbances, headache and right upper quadrant pain. Also denies nausea/vomiting/chest pain/epigastric pain/shortness of breath. Pain well controlled.     Objective:   T(F): 97.88 ( @ 08:00), Max: 98.8 ( @ 22:07)  HR: 70 ( @ 10:01)  BP: 110/56 ( @ 09:56) (99/52 - 207/108)  RR: 20 ( @ 23:13)  SpO2: 98% ( @ 10:01)  Vital Signs Last 24 Hrs  BP: 110/56 (29 May 2018 09:56) (99/52 - 207/108)     @ 07:01  -   @ 07:00  --------------------------------------------------------  IN: 625 mL / OUT: 300 mL / NET: 325 mL     @ 07:01  -   @ 10:04  --------------------------------------------------------  IN: 250 mL / OUT: 600 mL / NET: -350 mL    Gen: NAD, AAOx3  CV: RRR, no M/R/G  Pulm: CTAB, no R/R/W  Abd: soft, gravid, nontender, no rebound or guarding, no epigastric tenderness, liver nonpalpable +BS.   : Mora   Ext: +1 edema georgette, SCDs in place  Neuro: Reflexes 2+ bilaterally upper and lower    Medications:  citric acid/sodium citrate Solution 15 milliLiter(s) Oral every 4 hours  ibuprofen  Tablet 600 milliGRAM(s) Oral every 6 hours PRN  labetalol Injectable 80 milliGRAM(s) IV Push once  lactated ringers Bolus 125 milliLiter(s) IV Bolus once  lactated ringers. 1000 milliLiter(s) IV Continuous <Continuous>  magnesium sulfate Infusion 2 Gm/Hr IV Continuous <Continuous>  oxyCODONE    5 mG/acetaminophen 325 mG 2 Tablet(s) Oral every 6 hours PRN  oxytocin Infusion 125 milliUNIT(s)/Min IV Continuous <Continuous>    No Known Allergies    Labs:                        11.8   10.01 )-----------( 247      ( 28 May 2018 23:19 )             37.0         138  |  102  |  11  ----------------------------<  77  4.5   |  21  |  0.5<L>    Ca    9.3      28 May 2018 23:19    TPro  6.4  /  Alb  3.1<L>  /  TBili  0.2  /  DBili  x   /  AST  14  /  ALT  8   /  AlkPhos  764<H>    Uric Acid, Serum: 4.6 mg/dL ( @ 23:19)    Assessment:  37y  s/p  at 38w, PPD0, AMA, GDMA2, s/p labetalol IVP 20mg/40mg/80mg in labor and hydralazine IVP 5mg/5mg immediately postpartum, with pre-eclampsia with severe features now on Mg    Plan:  -Continue magnesium started at 0215, will continue until  00:49, 24h from delivery  -f/u pending mag level at 0800  -Neuro checks q2h  -Cont to monitor BPs and I/Os  -Pain management prn  -Cont seizure precautions  -Cont efm/toco Subjective:   Pt evaluated at bedside for magnesium check. She denies visual disturbances, headache and right upper quadrant pain. Also denies nausea/vomiting/chest pain/epigastric pain/shortness of breath. Pain well controlled.     Objective:   T(F): 97.88 ( @ 08:00), Max: 98.8 ( @ 22:07)  HR: 70 ( @ 10:01)  BP: 110/56 ( @ 09:56) (99/52 - 207/108)  RR: 20 ( @ 23:13)  SpO2: 98% ( @ 10:01)  Vital Signs Last 24 Hrs  BP: 110/56 (29 May 2018 09:56) (99/52 - 207/108)     @ 07:01  -   @ 07:00  --------------------------------------------------------  IN: 625 mL / OUT: 300 mL / NET: 325 mL     @ 07:01  -   @ 10:04  --------------------------------------------------------  IN: 250 mL / OUT: 600 mL / NET: -350 mL    Gen: NAD, AAOx3  CV: RRR, no M/R/G  Pulm: CTAB, no R/R/W  Abd: soft, gravid, nontender, no rebound or guarding, no epigastric tenderness, liver nonpalpable +BS.   : Mora   Ext: +1 edema georgette, SCDs in place  Neuro: Reflexes 2+ bilaterally upper and lower    Medications:  citric acid/sodium citrate Solution 15 milliLiter(s) Oral every 4 hours  ibuprofen  Tablet 600 milliGRAM(s) Oral every 6 hours PRN  labetalol Injectable 80 milliGRAM(s) IV Push once  lactated ringers Bolus 125 milliLiter(s) IV Bolus once  lactated ringers. 1000 milliLiter(s) IV Continuous <Continuous>  magnesium sulfate Infusion 2 Gm/Hr IV Continuous <Continuous>  oxyCODONE    5 mG/acetaminophen 325 mG 2 Tablet(s) Oral every 6 hours PRN  oxytocin Infusion 125 milliUNIT(s)/Min IV Continuous <Continuous>    No Known Allergies    Labs:                        11.8   10.01 )-----------( 247      ( 28 May 2018 23:19 )             37.0         138  |  102  |  11  ----------------------------<  77  4.5   |  21  |  0.5<L>    Ca    9.3      28 May 2018 23:19    TPro  6.4  /  Alb  3.1<L>  /  TBili  0.2  /  DBili  x   /  AST  14  /  ALT  8   /  AlkPhos  764<H>    Uric Acid, Serum: 4.6 mg/dL ( @ 23:19)    Assessment:  37y  s/p  at 38w, PPD0, AMA, GDMA2, s/p labetalol IVP 20mg/40mg/80mg in labor and hydralazine IVP 5mg/5mg immediately postpartum, with pre-eclampsia with severe features now on Mg    Plan:  -Continue magnesium started at 0215, will continue until  00:49, 24h from delivery  -f/u pending PEL and mag level  -Neuro checks q2h  -Cont to monitor BPs and I/Os  -Pain management prn  -Cont seizure precautions  -Cont efm/toco

## 2018-05-29 NOTE — PROGRESS NOTE ADULT - SUBJECTIVE AND OBJECTIVE BOX
Subjective:   Pt evaluated at bedside for magnesium check. She denies headache, vision changes, dizziness, SOB, chest pain, RUQ pain, or new onset swelling. Her pain is well controlled.     Objective:   HR: 71 (18 @ 23:25) (58 - 89)  BP: 107/56 (18 @ 23:25) (84/48 - 157/65)  SpO2: 98% (18 23:21) (92% - 100%)    18 @ 07:01  -  18 07:00  --------------------------------------------------------  IN: 625 mL / OUT: 300 mL / NET: 325 mL    18 @ 07:01  -  18 @ 23:27  --------------------------------------------------------  IN: 1775 mL / OUT: 3075 mL / NET: -1300 mL    UOP: clear, adequate, 162 cc/hr last 4 hours    Gen: NAD, AAOx3  CV: S1S2, RRR, no M/R/G  Pulm: CTAB, no rhonchi or rales or wheezing  Abd: soft, gravid, nontender, no rebound or guarding, no epigastric tenderness  Ext: 1+ bilateral pitting edema, no calf tenderness, SCDs in place  Neuro: 2+ DTR bilaterally     Medications:  hydrALAZINE Injectable: 5 (18 @ 01:19)  hydrALAZINE Injectable: 5 (18 @ 02:03)  ibuprofen  Tablet: 600 (18 @ 01:45)  magnesium sulfate  IVPB: 300 (18 @ 02:15)  magnesium sulfate Infusion: 50 (18 @ 01:20)  oxyCODONE    5 mG/acetaminophen 325 m (18 @ 16:41),  2 (18 @ 08:20)      Labs:                        10.9   16.06 )-----------( 242      ( 29 May 2018 09:58 )             34.1         133<L>  |  100  |  7<L>  ----------------------------<  116<H>  4.2   |  22  |  <0.5<L>    Ca    8.5      29 May 2018 09:58  Mg     4.5         TPro  5.6<L>  /  Alb  2.7<L>  /  TBili  0.2  /  DBili  x   /  AST  14  /  ALT  7   /  AlkPhos  622<H>      Magnesium, Serum: 4.5 mg/dL (18 @ 16:55)  Uric Acid, Serum: 4.6 mg/dL (18 @ 09:58)  Magnesium, Serum: 4.0 mg/dL (18 @ 09:58)

## 2018-05-29 NOTE — PROGRESS NOTE ADULT - ASSESSMENT
37y  s/p  at 38w, PPD0, AMA, GDMA2, s/p labetalol IVP 20mg/40mg/80mg in labor and hydralazine IVP 5mg/5mg immediately postpartum, with pre-eclampsia with severe features now on Mg  -continue Mg until 24 hours PP  -monitor vitals and q2h neuro checks  -clears  -strict IOs - pt refused armando catheter, wants to use bed pan  -pain management PRN    Dr. Betancourt and Dr. Wan aware

## 2018-05-29 NOTE — PROGRESS NOTE ADULT - SUBJECTIVE AND OBJECTIVE BOX
*Present and participated in uncomplicated/ atraumatic vaginal delivery.  *During BP measurements patient was in pain and moving, will monitor closely BP's after delivery and f/u PIH labs and decide if patient needs magnesium sulphate.

## 2018-05-29 NOTE — PROGRESS NOTE ADULT - SUBJECTIVE AND OBJECTIVE BOX
Subjective:   Pt evaluated at bedside for magnesium check. She denies headache, vision changes, dizziness, SOB, chest pain, RUQ pain, or new onset swelling. Her pain is well controlled.     Objective:   Vital signs: T(F): 98.6 (05-28-18 @ 23:13), Max: 98.8 (05-28-18 @ 22:07)  HR: 93 (05-29-18 @ 04:16) (79 - 108)  BP: 126/55 (05-29-18 @ 04:11) (126/55 - 207/108)  RR: 20 (05-28-18 @ 23:13) (20 - 20)  SpO2: 94% (05-29-18 @ 04:16) (94% - 100%)      Gen: NAD, AAOx3  CV: S1S2, RRR, no M/R/G  Pulm: CTAB, no rhonchi or rales or wheezing  Abd: soft, gravid, nontender, no rebound or guarding, no epigastric tenderness  Ext: 1+ bilateral pitting edema, no calf tenderness, SCDs in place  Neuro: 2+ DTR bilaterally     Medications:  hydrALAZINE Injectable: 5 (05-29-18 @ 01:19)  hydrALAZINE Injectable: 5 (05-29-18 @ 02:03)  ibuprofen  Tablet: 600 (05-29-18 @ 01:45)  labetalol Injectable: 20 (05-28-18 @ 22:55)  labetalol Injectable: 40 (05-28-18 @ 23:10)  magnesium sulfate  IVPB: 300 (05-29-18 @ 02:15)  magnesium sulfate Infusion: 50 (05-29-18 @ 01:20)        Labs:                        11.8   10.01 )-----------( 247      ( 28 May 2018 23:19 )             37.0     05-28    138  |  102  |  11  ----------------------------<  77  4.5   |  21  |  0.5<L>    Ca    9.3      28 May 2018 23:19    TPro  6.4  /  Alb  3.1<L>  /  TBili  0.2  /  DBili  x   /  AST  14  /  ALT  8   /  AlkPhos  764<H>  05-28    Uric Acid, Serum: 4.6 mg/dL (05-28-18 @ 23:19)

## 2018-05-29 NOTE — PROGRESS NOTE ADULT - SUBJECTIVE AND OBJECTIVE BOX
CRIST[facility] PGY1 Note:     Patient seen and examined at bedside. Denies CP/SOB/palpitations/flushing. Denies headache/blurred vision/RUQ/epigastric pain/new onset swelling.    T(C): 36.6 (18 @ 08:00), Max: 37.1 (18 @ 21:57)  HR: 73 (18 @ 16:31) (60 - 108)  BP: 148/70 (18 @ 16:26) (99/52 - 207/108)  RR: 20 (18 @ 23:13) (20 - 20)  SpO2: 100% (18 @ 16:31) (93% - 100%)     18 @ 07:01  -  18 @ 07:00  -------------------------------------------------------- UO: 700cc/3h-> 233.3cc/hr (adequate)   IN: 625 mL / OUT: 300 mL / NET: 325 mL    18 @ 07:01  -  18 @ 16:38  --------------------------------------------------------  IN: 1775 mL / OUT: 1725 mL / NET: 50 mL       Gen: NAD, AAO X 3  Abd: fundus firm, below umbilicus  Heart: RRR, S1S2+  Lungs: CTA B/L, no r/r/w  Ext: SCDs in situ, no edema/tenderness   LE: 2+ UE/LE DTRs B/L     citric acid/sodium citrate Solution 15 milliLiter(s) Oral every 4 hours  ibuprofen  Tablet 600 milliGRAM(s) Oral every 6 hours PRN  labetalol Injectable 80 milliGRAM(s) IV Push once  lactated ringers Bolus 125 milliLiter(s) IV Bolus once  lactated ringers. 1000 milliLiter(s) IV Continuous <Continuous>  magnesium sulfate Infusion 2 Gm/Hr IV Continuous <Continuous>  oxyCODONE    5 mG/acetaminophen 325 mG 2 Tablet(s) Oral every 6 hours PRN  oxytocin Infusion 125 milliUNIT(s)/Min IV Continuous <Continuous>     Labs: Upr/cr ratio pending        A/P: S/P , PPD0, GDMA2 S/P multiple labetalol IV pushes and S/P hydralazine IV, preeclampsia with severe features, Mg+, currently doing well  - c/w Mg+ until  0049   - f/u Upr/cr, Mg+   - neuro checks q2h, strict I/Os   - pain mgmt prn     Dr. Law and Dr. Aguirre aware.

## 2018-05-29 NOTE — PROGRESS NOTE ADULT - SUBJECTIVE AND OBJECTIVE BOX
PGY1 Note:    Summary of BP management:    Pt had severe range BPs in labor requiring multiple IV pushes - labetalol 20mg @2255 then labetalol 40mg @2310 then labetalol 80mg @2330. BPs were then elevated but not in severe range. Pt was very combative, refusing to give urine for upr/cr. Remaining pre-eclamptic labs listed below.    Pt then had severe range BPs immediately postpartum, with highest 200 systolic (repeat 180s systolic), requiring IV pushes of hydralazine 5mg at 0119 and another hydralazine 5mg at 0203. Decision made to start Mg for pre-eclampsia with severe features.                   11.8   10.01 )-----------( 247      ( 28 May 2018 23:19 )             37.0     05-28    138  |  102  |  11  ----------------------------<  77  4.5   |  21  |  0.5<L>    28 May 2018 23:19     AST  14  /  ALT  8     Uric Acid, Serum: 4.6 mg/dL (05-28-18 @ 23:19)

## 2018-05-30 LAB
CREAT ?TM UR-MCNC: 66 MG/DL — SIGNIFICANT CHANGE UP
PROT ?TM UR-MCNC: 57 MG/DLG/24H — SIGNIFICANT CHANGE UP
PROT/CREAT UR-RTO: 0.9 RATIO — HIGH (ref 0–0.2)

## 2018-05-30 RX ORDER — ALBUTEROL 90 UG/1
2 AEROSOL, METERED ORAL EVERY 6 HOURS
Qty: 0 | Refills: 0 | Status: DISCONTINUED | OUTPATIENT
Start: 2018-05-30 | End: 2018-05-31

## 2018-05-30 RX ADMIN — OXYCODONE AND ACETAMINOPHEN 2 TABLET(S): 5; 325 TABLET ORAL at 16:45

## 2018-05-30 RX ADMIN — OXYCODONE AND ACETAMINOPHEN 2 TABLET(S): 5; 325 TABLET ORAL at 08:08

## 2018-05-30 RX ADMIN — OXYCODONE AND ACETAMINOPHEN 2 TABLET(S): 5; 325 TABLET ORAL at 08:49

## 2018-05-30 RX ADMIN — OXYCODONE AND ACETAMINOPHEN 2 TABLET(S): 5; 325 TABLET ORAL at 00:39

## 2018-05-30 RX ADMIN — OXYCODONE AND ACETAMINOPHEN 2 TABLET(S): 5; 325 TABLET ORAL at 23:50

## 2018-05-30 RX ADMIN — OXYCODONE AND ACETAMINOPHEN 2 TABLET(S): 5; 325 TABLET ORAL at 23:16

## 2018-05-30 RX ADMIN — OXYCODONE AND ACETAMINOPHEN 2 TABLET(S): 5; 325 TABLET ORAL at 17:20

## 2018-05-30 NOTE — PROGRESS NOTE ADULT - SUBJECTIVE AND OBJECTIVE BOX
Subjective:   Patient doing well. No complaints. Minimal lochia. Pain controlled. Denies headache, visual changes, shortness of breath, chest pain, right upper quadrant pain, nausea, or vomiting.    Objective:   T(F): 96.5 ( @ 03:15), Max: 97.88 ( @ 08:00)  HR: 75 ( @ 04:00)  BP: 124/74 ( @ 04:00) (84/48 - 157/65), BPrange s/p magnesium 116-150/60-83  RR: 18 ( @ 03:15)  Gen: AAOx3, NAD  Chest: S1S2 RRR, lungs CTA bilaterally  Abd: Soft, appropriately tender, mildly distended, BS+  Incision: Dressing removed. Clean, dry, intact steris in place.  Fundus: Firm, appropriately tender, below the umbilicus  Lochia: Normal  Ext: No calf tenderness, no swelling    Labs:                        10.9   16.06 )-----------( 242      ( 29 May 2018 09:58 )             34.1       133<L>  |  100  |  7<L>  ----------------------------<  116<H>  4.2   |  22  |  <0.5<L>    Ca    8.5      29 May 2018 09:58  Mg     4.5         TPro  5.6<L>  /  Alb  2.7<L>  /  TBili  0.2  /  DBili  x   /  AST  14  /  ALT  7   /  AlkPhos  622<H>      Ambulating: Yes   Voiding: Yes   Flatus: Yes   Bowel movements: Yes  Breast or bottle feeding: Bottle  Diet: regular    Assessment:   37y  s/p  at 38w, PPD1, AMA, GDMA2, s/p labetalol IVP 20mg/40mg/80mg in labor and hydralazine IVP 5mg/5mg immediately postpartum, with pre-eclampsia with severe features s/p magnesium with occasional elevated BPs, for BP monitoring at this time, tolerating regular diet, improving well    Plan:  -Encourage ambulation and PO hydration  -cont to monitor BPs q4h, can consider starting something PO if needed  -pain management prn Subjective:   Patient doing well. No complaints. Minimal lochia. Pain controlled. Denies headache, visual changes, shortness of breath, chest pain, right upper quadrant pain, nausea, or vomiting.    Objective:   T(F): 96.5 ( @ 03:15), Max: 97.88 ( @ 08:00)  HR: 75 ( @ 04:00)  BP: 124/74 ( @ 04:00) (84/48 - 157/65), BP range s/p magnesium 116-150/60-83  RR: 18 ( @ 03:15)  Gen: AAOx3, NAD  Chest: S1S2 RRR, lungs CTA bilaterally  Abd: Soft, appropriately tender, mildly distended, BS+  Incision: Dressing removed. Clean, dry, intact steris in place.  Fundus: Firm, appropriately tender, below the umbilicus  Lochia: Normal  Ext: No calf tenderness, no swelling    Labs:                        10.9   16.06 )-----------( 242      ( 29 May 2018 09:58 )             34.1       133<L>  |  100  |  7<L>  ----------------------------<  116<H>  4.2   |  22  |  <0.5<L>    TPro  5.6<L>  /  Alb  2.7<L>  /  TBili  0.2  /  DBili  x   /  AST  14  /  ALT  7   /  AlkPhos  622<H>      Ambulating: Yes   Voiding: Yes   Flatus: Yes   Bowel movements: Yes  Breast or bottle feeding: Bottle  Diet: regular    Assessment:   37y  s/p  at 38w, PPD1, AMA, GDMA2, s/p labetalol IVP 20mg/40mg/80mg in labor and hydralazine IVP 5mg/5mg immediately postpartum, with pre-eclampsia with severe features s/p magnesium with occasional elevated BPs, for BP monitoring at this time, tolerating regular diet, improving well    Plan:  -Encourage ambulation and PO hydration  -cont to monitor BPs q4h, can consider starting something PO if needed  -pain management prn Subjective:   Patient doing well. No complaints. Minimal lochia. Pain controlled. Denies headache, visual changes, shortness of breath, chest pain, right upper quadrant pain, nausea, or vomiting.    Objective:   T(F): 96.5 ( @ 03:15), Max: 97.88 ( @ 08:00)  HR: 75 ( @ 04:00)  BP: 124/74 ( @ 04:00) (84/48 - 157/65), BP range s/p magnesium 116-150/60-83  RR: 18 ( @ 03:15)  Gen: AAOx3, NAD  Chest: S1S2 RRR, lungs CTA bilaterally  Abd: Soft, appropriately tender, mildly distended, BS+  Fundus: Firm, appropriately tender, below the umbilicus  Lochia: Normal  Ext: No calf tenderness, no swelling    Labs:                        10.9   16.06 )-----------( 242      ( 29 May 2018 09:58 )             34.1       133<L>  |  100  |  7<L>  ----------------------------<  116<H>  4.2   |  22  |  <0.5<L>    TPro  5.6<L>  /  Alb  2.7<L>  /  TBili  0.2  /  DBili  x   /  AST  14  /  ALT  7   /  AlkPhos  622<H>      Ambulating: Yes   Voiding: Yes   Flatus: Yes   Bowel movements: Yes  Breast or bottle feeding: Bottle  Diet: regular    Assessment:   37y  s/p  at 38w, PPD1, AMA, GDMA2, s/p labetalol IVP 20mg/40mg/80mg in labor and hydralazine IVP 5mg/5mg immediately postpartum, with pre-eclampsia with severe features s/p magnesium with occasional elevated BPs, for BP monitoring at this time, tolerating regular diet, improving well    Plan:  -Encourage ambulation and PO hydration  -cont to monitor BPs q4h, can consider starting something PO if needed  -pain management prn

## 2018-05-31 ENCOUNTER — APPOINTMENT (OUTPATIENT)
Dept: ANTEPARTUM | Facility: CLINIC | Age: 38
End: 2018-05-31

## 2018-05-31 ENCOUNTER — TRANSCRIPTION ENCOUNTER (OUTPATIENT)
Age: 38
End: 2018-05-31

## 2018-05-31 VITALS
TEMPERATURE: 97 F | HEART RATE: 68 BPM | DIASTOLIC BLOOD PRESSURE: 74 MMHG | RESPIRATION RATE: 20 BRPM | SYSTOLIC BLOOD PRESSURE: 133 MMHG

## 2018-05-31 LAB — SURGICAL PATHOLOGY STUDY: SIGNIFICANT CHANGE UP

## 2018-05-31 RX ORDER — NIFEDIPINE 30 MG
1 TABLET, EXTENDED RELEASE 24 HR ORAL
Qty: 0 | Refills: 0 | COMMUNITY
Start: 2018-05-31

## 2018-05-31 RX ORDER — GLYBURIDE 5 MG
1 TABLET ORAL
Qty: 0 | Refills: 0 | COMMUNITY

## 2018-05-31 RX ORDER — NIFEDIPINE 30 MG
30 TABLET, EXTENDED RELEASE 24 HR ORAL ONCE
Qty: 0 | Refills: 0 | Status: COMPLETED | OUTPATIENT
Start: 2018-05-31 | End: 2018-05-31

## 2018-05-31 RX ADMIN — OXYCODONE AND ACETAMINOPHEN 2 TABLET(S): 5; 325 TABLET ORAL at 06:20

## 2018-05-31 RX ADMIN — Medication 30 MILLIGRAM(S): at 13:52

## 2018-05-31 RX ADMIN — OXYCODONE AND ACETAMINOPHEN 2 TABLET(S): 5; 325 TABLET ORAL at 13:38

## 2018-05-31 RX ADMIN — OXYCODONE AND ACETAMINOPHEN 2 TABLET(S): 5; 325 TABLET ORAL at 12:55

## 2018-05-31 NOTE — DISCHARGE NOTE OB - CARE PROVIDER_API CALL
Renan Trevino), Medical Genetics; Obstetrics and Gynecology  [address] [place] [address]  Phone: [phone]  Fax: [phone]

## 2018-05-31 NOTE — DISCHARGE NOTE OB - PLAN OF CARE
healthy patient f/u with your doctor If you have headache, visual changes, shortness of breath, chest pain, right upper abdominal pain, call the doctor or return to the emergency room

## 2018-05-31 NOTE — DISCHARGE NOTE OB - PATIENT PORTAL LINK FT
You can access the [facility] Portal, offered by [facility], by registering with the following website: [email or web address]

## 2018-05-31 NOTE — DISCHARGE NOTE OB - CARE PLAN
Principal Discharge DX:	Vaginal delivery  Goal:	healthy patient  Assessment and plan of treatment:	f/u with your doctor  Secondary Diagnosis:	Preeclampsia, severe, third trimester  Goal:	healthy patient  Assessment and plan of treatment:	If you have headache, visual changes, shortness of breath, chest pain, right upper abdominal pain, call the doctor or return to the emergency room

## 2018-05-31 NOTE — DISCHARGE NOTE OB - MEDICATION SUMMARY - MEDICATIONS TO TAKE
I will START or STAY ON the medications listed below when I get home from the hospital:    albuterol 0.63 mg/3 mL (0.021%) inhalation solution  -- 3 milliliter(s) inhaled 3 times a day  -- Indication: For asthma I will START or STAY ON the medications listed below when I get home from the hospital:    albuterol 0.63 mg/3 mL (0.021%) inhalation solution  -- 3 milliliter(s) inhaled 3 times a day  -- Indication: For asthma    NIFEdipine 30 mg oral tablet, extended release  -- 1 tab(s) by mouth once  -- Indication: For BP meds

## 2018-05-31 NOTE — PROGRESS NOTE ADULT - SUBJECTIVE AND OBJECTIVE BOX
Subjective:   Patient doing well. No complaints. Minimal lochia. Pain controlled.    Objective:   T(F): 96.5 ( @ 22:45), Max: 97.1 ( @ 16:40)  HR: 70 ( @ 03:30)  BP: 137/68 ( @ 03:30) (129/63 - 163/74)  RR: 18 ( @ 22:45)  Gen: AAOx3, NAD  Chest: S1S2 RRR, lungs CTA bilaterally  Abd: Soft, appropriately tender, mildly distended, BS+  Fundus: Firm, appropriately tender, below the umbilicus  Lochia: Normal  Ext: No calf tenderness, no swelling    Labs:  None new    Ambulating: Yes   Voiding: Yes   Flatus: Yes   Bowel movements: Yes  Breast or bottle feeding: Breast Bottle  Diet: regular    Assessment:   37y  s/p  at 38w, PPD1, AMA, GDMA2, s/p labetalol IVP 20mg/40mg/80mg in labor and hydralazine IVP 5mg/5mg immediately postpartum, with pre-eclampsia with severe features s/p magnesium with occasional elevated BPs, for BP monitoring at this time, tolerating regular diet, improving well    Plan:  -Encourage ambulation and PO hydration  -Plan discharge today with VNS pending AM BPs  -routine restrictions and instructions given  -F/U with MD in 1 and 6 weeks for postpartum visit  -All questions answered

## 2018-05-31 NOTE — DISCHARGE NOTE OB - HOSPITAL COURSE
Came in labor, delivered vaginally, met criteria for preeclampsia with severe range blood pressures, magnesium given for 24hrs, then transferred to floor, uncomplicated postpartum care

## 2018-05-31 NOTE — DISCHARGE NOTE OB - MEDICATION SUMMARY - MEDICATIONS TO STOP TAKING
I will STOP taking the medications listed below when I get home from the hospital:    glyBURIDE 2.5 mg oral tablet  -- 1 tab(s) by mouth once a day

## 2018-06-05 DIAGNOSIS — Z3A.38 38 WEEKS GESTATION OF PREGNANCY: ICD-10-CM

## 2018-06-05 DIAGNOSIS — Z34.83 ENCOUNTER FOR SUPERVISION OF OTHER NORMAL PREGNANCY, THIRD TRIMESTER: ICD-10-CM

## 2018-06-07 ENCOUNTER — OUTPATIENT (OUTPATIENT)
Dept: OUTPATIENT SERVICES | Facility: HOSPITAL | Age: 38
LOS: 1 days | Discharge: HOME | End: 2018-06-07

## 2018-06-07 ENCOUNTER — APPOINTMENT (OUTPATIENT)
Dept: ANTEPARTUM | Facility: CLINIC | Age: 38
End: 2018-06-07

## 2018-06-07 VITALS
SYSTOLIC BLOOD PRESSURE: 136 MMHG | HEART RATE: 87 BPM | OXYGEN SATURATION: 96 % | DIASTOLIC BLOOD PRESSURE: 87 MMHG | TEMPERATURE: 97.3 F

## 2018-06-07 DIAGNOSIS — Z98.890 OTHER SPECIFIED POSTPROCEDURAL STATES: Chronic | ICD-10-CM

## 2018-06-14 ENCOUNTER — APPOINTMENT (OUTPATIENT)
Dept: ANTEPARTUM | Facility: CLINIC | Age: 38
End: 2018-06-14

## 2018-07-12 ENCOUNTER — APPOINTMENT (OUTPATIENT)
Dept: ANTEPARTUM | Facility: CLINIC | Age: 38
End: 2018-07-12

## 2018-07-12 ENCOUNTER — OUTPATIENT (OUTPATIENT)
Dept: OUTPATIENT SERVICES | Facility: HOSPITAL | Age: 38
LOS: 1 days | Discharge: HOME | End: 2018-07-12

## 2018-07-12 VITALS
TEMPERATURE: 98.1 F | DIASTOLIC BLOOD PRESSURE: 79 MMHG | WEIGHT: 191 LBS | BODY MASS INDEX: 29.92 KG/M2 | HEART RATE: 77 BPM | SYSTOLIC BLOOD PRESSURE: 130 MMHG | OXYGEN SATURATION: 100 %

## 2018-07-12 DIAGNOSIS — Z98.890 OTHER SPECIFIED POSTPROCEDURAL STATES: Chronic | ICD-10-CM

## 2018-07-12 DIAGNOSIS — Z00.00 ENCOUNTER FOR GENERAL ADULT MEDICAL EXAMINATION W/OUT ABNORMAL FINDINGS: ICD-10-CM

## 2018-07-12 DIAGNOSIS — O99.013 ANEMIA COMPLICATING PREGNANCY, THIRD TRIMESTER: ICD-10-CM

## 2018-07-12 LAB
GLUCOSE BLDC GLUCOMTR-MCNC: 131
HCG UR QL: NEGATIVE
QUALITY CONTROL: YES

## 2018-07-12 RX ORDER — MEDROXYPROGESTERONE ACETATE 150 MG/ML
150 INJECTION, SUSPENSION INTRAMUSCULAR
Qty: 1 | Refills: 5 | Status: ACTIVE | COMMUNITY
Start: 2018-07-12 | End: 1900-01-01

## 2018-07-12 RX ORDER — MEDROXYPROGESTERONE ACETATE 150 MG/ML
150 INJECTION, SUSPENSION INTRAMUSCULAR
Refills: 0 | Status: COMPLETED | OUTPATIENT
Start: 2018-07-12

## 2018-07-12 RX ADMIN — Medication 0 MG/ML: at 00:00

## 2018-07-16 DIAGNOSIS — I10 ESSENTIAL (PRIMARY) HYPERTENSION: ICD-10-CM

## 2019-01-08 NOTE — OB PROVIDER H&P - NS_EDDCALCULATED_OBGYN_ALL_OB
HB A1c is 6 0 in the office today  Fasting sugar was quite good  Continue diet and exercise  Second Trimester Sonogram

## 2019-05-13 ENCOUNTER — OUTPATIENT (OUTPATIENT)
Dept: OUTPATIENT SERVICES | Facility: HOSPITAL | Age: 39
LOS: 1 days | Discharge: HOME | End: 2019-05-13

## 2019-05-13 DIAGNOSIS — Z98.890 OTHER SPECIFIED POSTPROCEDURAL STATES: Chronic | ICD-10-CM

## 2019-05-14 PROBLEM — J45.909 UNSPECIFIED ASTHMA, UNCOMPLICATED: Chronic | Status: ACTIVE | Noted: 2018-05-19

## 2019-10-03 ENCOUNTER — OUTPATIENT (OUTPATIENT)
Dept: OUTPATIENT SERVICES | Facility: HOSPITAL | Age: 39
LOS: 1 days | Discharge: HOME | End: 2019-10-03

## 2019-10-03 DIAGNOSIS — Z98.890 OTHER SPECIFIED POSTPROCEDURAL STATES: Chronic | ICD-10-CM

## 2019-10-04 DIAGNOSIS — K02.63 DENTAL CARIES ON SMOOTH SURFACE PENETRATING INTO PULP: ICD-10-CM

## 2020-06-23 ENCOUNTER — EMERGENCY (EMERGENCY)
Facility: HOSPITAL | Age: 40
LOS: 0 days | Discharge: HOME | End: 2020-06-23
Attending: EMERGENCY MEDICINE | Admitting: EMERGENCY MEDICINE
Payer: MEDICAID

## 2020-06-23 VITALS
OXYGEN SATURATION: 98 % | SYSTOLIC BLOOD PRESSURE: 134 MMHG | HEART RATE: 72 BPM | TEMPERATURE: 98 F | DIASTOLIC BLOOD PRESSURE: 64 MMHG | RESPIRATION RATE: 17 BRPM

## 2020-06-23 DIAGNOSIS — F17.200 NICOTINE DEPENDENCE, UNSPECIFIED, UNCOMPLICATED: ICD-10-CM

## 2020-06-23 DIAGNOSIS — K02.9 DENTAL CARIES, UNSPECIFIED: ICD-10-CM

## 2020-06-23 DIAGNOSIS — Z98.890 OTHER SPECIFIED POSTPROCEDURAL STATES: Chronic | ICD-10-CM

## 2020-06-23 DIAGNOSIS — Z79.899 OTHER LONG TERM (CURRENT) DRUG THERAPY: ICD-10-CM

## 2020-06-23 DIAGNOSIS — J45.909 UNSPECIFIED ASTHMA, UNCOMPLICATED: ICD-10-CM

## 2020-06-23 DIAGNOSIS — Z98.890 OTHER SPECIFIED POSTPROCEDURAL STATES: ICD-10-CM

## 2020-06-23 DIAGNOSIS — K08.89 OTHER SPECIFIED DISORDERS OF TEETH AND SUPPORTING STRUCTURES: ICD-10-CM

## 2020-06-23 DIAGNOSIS — K03.81 CRACKED TOOTH: ICD-10-CM

## 2020-06-23 DIAGNOSIS — Z79.51 LONG TERM (CURRENT) USE OF INHALED STEROIDS: ICD-10-CM

## 2020-06-23 PROCEDURE — 99282 EMERGENCY DEPT VISIT SF MDM: CPT

## 2020-06-23 NOTE — ED PROVIDER NOTE - PROGRESS NOTE DETAILS
ATTENDING NOTE: 39 y.o female no significant PMH presents c/o dental pain/injury. Pt tried to open something with tooth number two and partially broke it. Pt has mild pain and is here seeking dental clinic evaluation. No fevers, chills, n/v, difficulty breathing or swallowing. On exam: Pt has poor hygiene, multiple missing teeth. Tooth number one rotting with large cavity. Tooth number two partially broken. No gum swelling or signs of infection. Plan: Transfer to dental clinic. ATTENDING NOTE: 39 y.o female, no PMH, presents c/o dental pain/injury. Pt tried to open something with her teeth and front tooth broke. Pt has mild pain. No fever/chills, n/v, difficulty breathing or swallowing. On exam: Pt with poor dental hygiene, multiple missing teeth. Tooth #9 broken at the gum level, tooth #10 with big cavity. No gum swelling or signs of infection. Plan: Transfer to dental clinic.

## 2020-06-23 NOTE — ED PROVIDER NOTE - NS ED ROS FT
Review of Systems:  CONSTITUTIONAL - No fever  SKIN - No rash  HEMATOLOGIC - No abnormal bleeding or bruising  RESPIRATORY - No shortness of breath, No cough  CARDIAC -No chest pain, No palpitations  GI - No abdominal pain, No nausea, No vomiting, No diarrhea  MUSCULOSKELETAL - No joint paint, No swelling  All other systems negative, unless specified in HPI

## 2020-06-23 NOTE — ED PROVIDER NOTE - PHYSICAL EXAMINATION
VITALS: Reviewed  CONSTITUTIONAL: well developed, well nourished, in no acute distress, speaking in full sentences, nontoxic appearing  SKIN: warm, dry, no rash  HEAD: normocephalic, atraumatic  EYES: PERRL, EOMI, no conjunctival erythema, sclera clear  ENT: patent airway, moist mucous membranes--missing teeth 2-4, 10, 18-20, 29-31, no gingival swelling, tenderness to percussion, +cavities  NECK: supple, no masses  CV:  regular rate, regular rhythm, 2+ radial pulses bilaterally  RESP: no wheezes, no rales, no rhonchi, normal work of breathing  ABD: soft, nontender, nondistended, no rebound, no guarding  MSK: normal ROM, no cyanosis, no edema  NEURO: alert, oriented x3  PSYCH: cooperative, appropriate

## 2020-06-23 NOTE — ED PROVIDER NOTE - OBJECTIVE STATEMENT
39Y F with PMH of asthma presents with CC of dental pain. Patient reports that she had a loose 39Y F with PMH of asthma presents with CC of dental pain. Patient reports that she had a loose tooth number 39Y F with PMH of asthma presents with CC of dental pain. Patient reports that she had a loose tooth number 10, tried to open something with her mouth, and the teeth came out. Denies to swallowing the tooth. Denies fevers, chest pain, SOB, abdominal pain, N/V/D. Does not regularly see a dentist.

## 2021-02-05 ENCOUNTER — RESULT REVIEW (OUTPATIENT)
Age: 41
End: 2021-02-05